# Patient Record
Sex: FEMALE | Race: WHITE | NOT HISPANIC OR LATINO | URBAN - METROPOLITAN AREA
[De-identification: names, ages, dates, MRNs, and addresses within clinical notes are randomized per-mention and may not be internally consistent; named-entity substitution may affect disease eponyms.]

---

## 2017-01-12 ENCOUNTER — EMERGENCY (EMERGENCY)
Facility: HOSPITAL | Age: 42
LOS: 1 days | Discharge: HOME | End: 2017-01-12
Attending: EMERGENCY MEDICINE | Admitting: EMERGENCY MEDICINE
Payer: COMMERCIAL

## 2017-01-12 VITALS
HEART RATE: 81 BPM | RESPIRATION RATE: 18 BRPM | DIASTOLIC BLOOD PRESSURE: 110 MMHG | SYSTOLIC BLOOD PRESSURE: 147 MMHG | TEMPERATURE: 98 F | OXYGEN SATURATION: 98 %

## 2017-01-12 DIAGNOSIS — G44.209 TENSION-TYPE HEADACHE, UNSPECIFIED, NOT INTRACTABLE: ICD-10-CM

## 2017-01-12 DIAGNOSIS — R42 DIZZINESS AND GIDDINESS: ICD-10-CM

## 2017-01-12 DIAGNOSIS — F32.9 MAJOR DEPRESSIVE DISORDER, SINGLE EPISODE, UNSPECIFIED: ICD-10-CM

## 2017-01-12 DIAGNOSIS — Z88.8 ALLERGY STATUS TO OTHER DRUGS, MEDICAMENTS AND BIOLOGICAL SUBSTANCES STATUS: ICD-10-CM

## 2017-01-12 PROCEDURE — 99285 EMERGENCY DEPT VISIT HI MDM: CPT | Mod: 25

## 2017-01-12 PROCEDURE — 62270 DX LMBR SPI PNXR: CPT

## 2017-01-12 RX ORDER — SODIUM CHLORIDE 9 MG/ML
1000 INJECTION INTRAMUSCULAR; INTRAVENOUS; SUBCUTANEOUS ONCE
Qty: 0 | Refills: 0 | Status: COMPLETED | OUTPATIENT
Start: 2017-01-12 | End: 2017-01-12

## 2017-01-12 RX ORDER — METOCLOPRAMIDE HCL 10 MG
10 TABLET ORAL ONCE
Qty: 0 | Refills: 0 | Status: COMPLETED | OUTPATIENT
Start: 2017-01-12 | End: 2017-01-12

## 2017-01-12 RX ORDER — ACETAMINOPHEN 500 MG
1000 TABLET ORAL ONCE
Qty: 0 | Refills: 0 | Status: COMPLETED | OUTPATIENT
Start: 2017-01-12 | End: 2017-01-12

## 2017-01-12 RX ORDER — DIPHENHYDRAMINE HCL 50 MG
25 CAPSULE ORAL ONCE
Qty: 0 | Refills: 0 | Status: COMPLETED | OUTPATIENT
Start: 2017-01-12 | End: 2017-01-12

## 2017-01-12 RX ADMIN — Medication 10 MILLIGRAM(S): at 23:40

## 2017-01-12 RX ADMIN — SODIUM CHLORIDE 1000 MILLILITER(S): 9 INJECTION INTRAMUSCULAR; INTRAVENOUS; SUBCUTANEOUS at 23:40

## 2017-01-12 RX ADMIN — Medication 25 MILLIGRAM(S): at 23:40

## 2017-01-12 RX ADMIN — Medication 400 MILLIGRAM(S): at 23:40
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
## 2017-01-13 VITALS
RESPIRATION RATE: 18 BRPM | HEART RATE: 84 BPM | TEMPERATURE: 97 F | SYSTOLIC BLOOD PRESSURE: 116 MMHG | OXYGEN SATURATION: 99 % | DIASTOLIC BLOOD PRESSURE: 51 MMHG

## 2017-01-13 LAB
ALBUMIN SERPL ELPH-MCNC: 4.2 G/DL — SIGNIFICANT CHANGE UP (ref 3.3–5)
ALP SERPL-CCNC: 33 U/L — LOW (ref 40–120)
ALT FLD-CCNC: 30 U/L RC — SIGNIFICANT CHANGE UP (ref 10–45)
ANION GAP SERPL CALC-SCNC: 13 MMOL/L — SIGNIFICANT CHANGE UP (ref 5–17)
APPEARANCE CSF: CLEAR — SIGNIFICANT CHANGE UP
APTT BLD: 30.7 SEC — SIGNIFICANT CHANGE UP (ref 27.5–37.4)
AST SERPL-CCNC: 24 U/L — SIGNIFICANT CHANGE UP (ref 10–40)
BASOPHILS # BLD AUTO: 0 K/UL — SIGNIFICANT CHANGE UP (ref 0–0.2)
BASOPHILS NFR BLD AUTO: 0.3 % — SIGNIFICANT CHANGE UP (ref 0–2)
BILIRUB SERPL-MCNC: 0.4 MG/DL — SIGNIFICANT CHANGE UP (ref 0.2–1.2)
BUN SERPL-MCNC: 20 MG/DL — SIGNIFICANT CHANGE UP (ref 7–23)
CALCIUM SERPL-MCNC: 9.5 MG/DL — SIGNIFICANT CHANGE UP (ref 8.4–10.5)
CHLORIDE SERPL-SCNC: 107 MMOL/L — SIGNIFICANT CHANGE UP (ref 96–108)
CO2 SERPL-SCNC: 22 MMOL/L — SIGNIFICANT CHANGE UP (ref 22–31)
COLOR CSF: SIGNIFICANT CHANGE UP
CREAT SERPL-MCNC: 0.99 MG/DL — SIGNIFICANT CHANGE UP (ref 0.5–1.3)
EOSINOPHIL # BLD AUTO: 0.2 K/UL — SIGNIFICANT CHANGE UP (ref 0–0.5)
EOSINOPHIL NFR BLD AUTO: 2.4 % — SIGNIFICANT CHANGE UP (ref 0–6)
GLUCOSE CSF-MCNC: 68 MG/DL — SIGNIFICANT CHANGE UP (ref 40–70)
GLUCOSE SERPL-MCNC: 101 MG/DL — HIGH (ref 70–99)
HCT VFR BLD CALC: 35.3 % — SIGNIFICANT CHANGE UP (ref 34.5–45)
HGB BLD-MCNC: 12.1 G/DL — SIGNIFICANT CHANGE UP (ref 11.5–15.5)
INR BLD: 1.05 RATIO — SIGNIFICANT CHANGE UP (ref 0.88–1.16)
LYMPHOCYTES # BLD AUTO: 2.6 K/UL — SIGNIFICANT CHANGE UP (ref 1–3.3)
LYMPHOCYTES # BLD AUTO: 36.9 % — SIGNIFICANT CHANGE UP (ref 13–44)
LYMPHOCYTES # CSF: 90 % — HIGH (ref 40–80)
MCHC RBC-ENTMCNC: 30.7 PG — SIGNIFICANT CHANGE UP (ref 27–34)
MCHC RBC-ENTMCNC: 34.3 GM/DL — SIGNIFICANT CHANGE UP (ref 32–36)
MCV RBC AUTO: 89.5 FL — SIGNIFICANT CHANGE UP (ref 80–100)
MONOCYTES # BLD AUTO: 0.6 K/UL — SIGNIFICANT CHANGE UP (ref 0–0.9)
MONOCYTES NFR BLD AUTO: 8 % — SIGNIFICANT CHANGE UP (ref 2–14)
MONOS+MACROS NFR CSF: 10 % — LOW (ref 15–45)
NEUTROPHILS # BLD AUTO: 3.6 K/UL — SIGNIFICANT CHANGE UP (ref 1.8–7.4)
NEUTROPHILS # CSF: SIGNIFICANT CHANGE UP (ref 0–6)
NEUTROPHILS NFR BLD AUTO: 52.4 % — SIGNIFICANT CHANGE UP (ref 43–77)
NRBC NFR CSF: 1 /UL — SIGNIFICANT CHANGE UP (ref 0–5)
PLATELET # BLD AUTO: 206 K/UL — SIGNIFICANT CHANGE UP (ref 150–400)
POTASSIUM SERPL-MCNC: 3.8 MMOL/L — SIGNIFICANT CHANGE UP (ref 3.5–5.3)
POTASSIUM SERPL-SCNC: 3.8 MMOL/L — SIGNIFICANT CHANGE UP (ref 3.5–5.3)
PROT CSF-MCNC: 39 MG/DL — SIGNIFICANT CHANGE UP (ref 15–45)
PROT SERPL-MCNC: 6.8 G/DL — SIGNIFICANT CHANGE UP (ref 6–8.3)
PROTHROM AB SERPL-ACNC: 11.4 SEC — SIGNIFICANT CHANGE UP (ref 10–13.1)
RBC # BLD: 3.95 M/UL — SIGNIFICANT CHANGE UP (ref 3.8–5.2)
RBC # CSF: 2 /UL — HIGH (ref 0–0)
RBC # FLD: 12.5 % — SIGNIFICANT CHANGE UP (ref 10.3–14.5)
SODIUM SERPL-SCNC: 142 MMOL/L — SIGNIFICANT CHANGE UP (ref 135–145)
TUBE TYPE: SIGNIFICANT CHANGE UP
WBC # BLD: 7 K/UL — SIGNIFICANT CHANGE UP (ref 3.8–10.5)
WBC # FLD AUTO: 7 K/UL — SIGNIFICANT CHANGE UP (ref 3.8–10.5)

## 2017-01-13 PROCEDURE — 99284 EMERGENCY DEPT VISIT MOD MDM: CPT | Mod: 25

## 2017-01-13 PROCEDURE — 62270 DX LMBR SPI PNXR: CPT

## 2017-01-13 PROCEDURE — 85730 THROMBOPLASTIN TIME PARTIAL: CPT

## 2017-01-13 PROCEDURE — 70450 CT HEAD/BRAIN W/O DYE: CPT

## 2017-01-13 PROCEDURE — 85610 PROTHROMBIN TIME: CPT

## 2017-01-13 PROCEDURE — 80053 COMPREHEN METABOLIC PANEL: CPT

## 2017-01-13 PROCEDURE — 96375 TX/PRO/DX INJ NEW DRUG ADDON: CPT | Mod: XU

## 2017-01-13 PROCEDURE — 82945 GLUCOSE OTHER FLUID: CPT

## 2017-01-13 PROCEDURE — 96374 THER/PROPH/DIAG INJ IV PUSH: CPT | Mod: XU

## 2017-01-13 PROCEDURE — 89051 BODY FLUID CELL COUNT: CPT

## 2017-01-13 PROCEDURE — 85027 COMPLETE CBC AUTOMATED: CPT

## 2017-01-13 PROCEDURE — 84702 CHORIONIC GONADOTROPIN TEST: CPT

## 2017-01-13 PROCEDURE — 87070 CULTURE OTHR SPECIMN AEROBIC: CPT

## 2017-01-13 PROCEDURE — 70450 CT HEAD/BRAIN W/O DYE: CPT | Mod: 26

## 2017-01-13 PROCEDURE — 84157 ASSAY OF PROTEIN OTHER: CPT

## 2017-01-13 PROCEDURE — 87205 SMEAR GRAM STAIN: CPT

## 2017-01-13 RX ORDER — MECLIZINE HCL 12.5 MG
12.5 TABLET ORAL ONCE
Qty: 0 | Refills: 0 | Status: COMPLETED | OUTPATIENT
Start: 2017-01-13 | End: 2017-01-13

## 2017-01-13 RX ADMIN — Medication 1000 MILLIGRAM(S): at 07:18

## 2017-01-13 RX ADMIN — Medication 12.5 MILLIGRAM(S): at 07:17

## 2017-01-13 NOTE — ED PROVIDER NOTE - CONSTITUTIONAL NEGATIVE STATEMENT, MLM
+HA, stiff neck. No fever, no chills, no chest pain, no SOB, no cough, no abdominal pain, no nausea, no vomiting, no joint pain, no rashes, no focal neurologic complaints, no psychiatric issues, otherwise as HPI.

## 2017-01-13 NOTE — ED PROVIDER NOTE - OBJECTIVE STATEMENT
Isabella Caro DO: Isabella Gordo, DO: 42F with hx of Depression p/w sudden onset b/l headache starting at 10:30 AM.  No prior hx of HA and describes this headache as worst headache of life. +Stiff neck, 10-15s of vision changes. Denies photophobia, phonophobia, n/v, recreational drug use, f/c, vision changes at present, abdominal pain.

## 2017-01-13 NOTE — ED PROCEDURE NOTE - CPROC ED POST PROC CARE GUIDE1
Instructed patient/caregiver regarding signs and symptoms of infection./Instructed patient/caregiver to follow-up with primary care physician./Verbal/written post procedure instructions were given to patient/caregiver.

## 2017-01-13 NOTE — ED ADULT NURSE REASSESSMENT NOTE - NS ED NURSE REASSESS COMMENT FT1
Pt c/o dizziness, pt A&Ox4, skin warm and dry, no c/o nausea.Pt medicated per MAR, will continue to monitor until pt feels well enough to go home.
Pt with steady gait and denied dizziness while in room getting dressed and walking.  While walking to bathroom on her way out she stated that she had another episode of dizziness, MD and RN spoke with pt, pt driving self and unwilling to call cab, agreeable to stay and trial meclizine before leaving.  Will reassess after medicating.  IV OUT.
Discharge teaching performed by night shift RN.  Pt stated she did not want to stay any longer.  Pt encouraged not to drive if dizzy.  MD aware.

## 2017-01-13 NOTE — ED PROVIDER NOTE - PHYSICAL EXAMINATION
Isabella Caro, DO: PE: CONSTITUTIONAL: Well appearing, well nourished, in no apparent distress. ENMT: Airway patent, nasal mucosa clear, mouth with normal mucosa, full ROM of neck HEAD: NCAT EYES: PERRL, EOMI, no nystagmus CARDIAC: RRR, no m/r/g, no pedal edema RESPIRATORY: CTA b/l, no adventitious sounds GI: Abdomen non-distended, non-tender MSK: Spine appears normal, range of motion is not limited, no muscle/joint tenderness NEURO: CNII-XII intact, 5/5 strength, full sensation all extremities, gait intact SKIN: No rash

## 2017-01-13 NOTE — ED PROVIDER NOTE - ATTENDING CONTRIBUTION TO CARE
Attending MD Prince:  I personally have seen and examined this patient.  Resident note reviewed and agree on plan of care and except where noted.  See MDM for details.

## 2017-01-13 NOTE — ED PROVIDER NOTE - PROGRESS NOTE DETAILS
Isabella Caro, DO: Pt feeling dizzy after ambulating while ready to discharge. Plan to give Meclizine & watch & wait. Isabella Caro, DO: Pt ultimately unwilling to wait for response of Meclizine. Discharge with Neuro f/u. Patient s/p LP with results reviewed. Patient initially stated she felt better and prepared discharge with neurology follow up. On discharge patient co recurrent HA and dizziness, discussed with patient to try meclizine and continue observing. Pt does not want to stay in the hospital any longer and wanted to be discharged, Plan discussed to followup and return for any concerns. CHIRAG

## 2017-01-13 NOTE — ED PROVIDER NOTE - MEDICAL DECISION MAKING DETAILS
Attending MD Prince: 41 yo female with PMH for depression and ADD presents with complaint of sudden onset of headache at 10:30a, brief vision changes, and some neck pain.  No nausea or vomiting.  No FH of aneurysms.  No hx of headache like this, "worst headache of her life".  Exam (MD Niki): A &O x 3, pupils equal and reactive, neck supple, lungs clear, abd soft, ext without edema, neuro non focal.

## 2017-01-18 ENCOUNTER — APPOINTMENT (OUTPATIENT)
Dept: INTERNAL MEDICINE | Facility: CLINIC | Age: 42
End: 2017-01-18

## 2017-01-18 VITALS
DIASTOLIC BLOOD PRESSURE: 60 MMHG | HEART RATE: 96 BPM | OXYGEN SATURATION: 99 % | SYSTOLIC BLOOD PRESSURE: 130 MMHG | HEIGHT: 72 IN | BODY MASS INDEX: 33.18 KG/M2 | WEIGHT: 245 LBS

## 2017-01-18 RX ORDER — CLONAZEPAM 2 MG/1
2 TABLET ORAL
Qty: 60 | Refills: 0 | Status: COMPLETED | COMMUNITY
Start: 2016-09-20

## 2017-01-25 ENCOUNTER — APPOINTMENT (OUTPATIENT)
Dept: NEUROLOGY | Facility: CLINIC | Age: 42
End: 2017-01-25

## 2017-02-16 ENCOUNTER — RESULT REVIEW (OUTPATIENT)
Age: 42
End: 2017-02-16

## 2017-03-02 RX ORDER — CYCLOBENZAPRINE HYDROCHLORIDE 10 MG/1
10 TABLET, FILM COATED ORAL
Qty: 30 | Refills: 0 | Status: COMPLETED | COMMUNITY
Start: 2017-03-02 | End: 2017-04-01

## 2017-03-09 ENCOUNTER — RX RENEWAL (OUTPATIENT)
Age: 42
End: 2017-03-09

## 2017-03-13 ENCOUNTER — APPOINTMENT (OUTPATIENT)
Dept: PHYSICAL MEDICINE AND REHAB | Facility: CLINIC | Age: 42
End: 2017-03-13

## 2017-03-27 ENCOUNTER — APPOINTMENT (OUTPATIENT)
Dept: INTERNAL MEDICINE | Facility: CLINIC | Age: 42
End: 2017-03-27

## 2017-03-27 VITALS
WEIGHT: 238 LBS | HEIGHT: 72 IN | DIASTOLIC BLOOD PRESSURE: 80 MMHG | HEART RATE: 85 BPM | OXYGEN SATURATION: 98 % | BODY MASS INDEX: 32.23 KG/M2 | SYSTOLIC BLOOD PRESSURE: 135 MMHG

## 2017-03-27 DIAGNOSIS — E04.1 NONTOXIC SINGLE THYROID NODULE: ICD-10-CM

## 2017-03-27 RX ORDER — OXYCODONE AND ACETAMINOPHEN 5; 325 MG/1; MG/1
5-325 TABLET ORAL 3 TIMES DAILY
Qty: 90 | Refills: 0 | Status: COMPLETED | COMMUNITY
Start: 2017-03-27 | End: 2017-04-26

## 2017-04-07 ENCOUNTER — EMERGENCY (EMERGENCY)
Facility: HOSPITAL | Age: 42
LOS: 1 days | Discharge: HOME | End: 2017-04-07
Attending: EMERGENCY MEDICINE | Admitting: EMERGENCY MEDICINE
Payer: COMMERCIAL

## 2017-04-07 ENCOUNTER — APPOINTMENT (OUTPATIENT)
Dept: INTERNAL MEDICINE | Facility: CLINIC | Age: 42
End: 2017-04-07

## 2017-04-07 VITALS
RESPIRATION RATE: 20 BRPM | HEART RATE: 98 BPM | DIASTOLIC BLOOD PRESSURE: 92 MMHG | SYSTOLIC BLOOD PRESSURE: 142 MMHG | HEIGHT: 72 IN | TEMPERATURE: 98 F | OXYGEN SATURATION: 98 % | WEIGHT: 235.01 LBS

## 2017-04-07 VITALS
OXYGEN SATURATION: 98 % | DIASTOLIC BLOOD PRESSURE: 90 MMHG | BODY MASS INDEX: 31.83 KG/M2 | HEIGHT: 72 IN | HEART RATE: 94 BPM | SYSTOLIC BLOOD PRESSURE: 140 MMHG | TEMPERATURE: 99 F | WEIGHT: 235 LBS

## 2017-04-07 DIAGNOSIS — N39.0 URINARY TRACT INFECTION, SITE NOT SPECIFIED: ICD-10-CM

## 2017-04-07 DIAGNOSIS — M54.9 DORSALGIA, UNSPECIFIED: ICD-10-CM

## 2017-04-07 DIAGNOSIS — G89.29 OTHER CHRONIC PAIN: ICD-10-CM

## 2017-04-07 DIAGNOSIS — Z88.8 ALLERGY STATUS TO OTHER DRUGS, MEDICAMENTS AND BIOLOGICAL SUBSTANCES STATUS: ICD-10-CM

## 2017-04-07 DIAGNOSIS — R51 HEADACHE: ICD-10-CM

## 2017-04-07 DIAGNOSIS — R00.2 PALPITATIONS: ICD-10-CM

## 2017-04-07 DIAGNOSIS — Z87.891 PERSONAL HISTORY OF NICOTINE DEPENDENCE: ICD-10-CM

## 2017-04-07 DIAGNOSIS — Z79.899 OTHER LONG TERM (CURRENT) DRUG THERAPY: ICD-10-CM

## 2017-04-07 DIAGNOSIS — N12 TUBULO-INTERSTITIAL NEPHRITIS, NOT SPECIFIED AS ACUTE OR CHRONIC: ICD-10-CM

## 2017-04-07 LAB
ALBUMIN SERPL ELPH-MCNC: 4 G/DL — SIGNIFICANT CHANGE UP (ref 3.3–5)
ALP SERPL-CCNC: 29 U/L — LOW (ref 40–120)
ALT FLD-CCNC: 23 U/L RC — SIGNIFICANT CHANGE UP (ref 10–45)
ANION GAP SERPL CALC-SCNC: 15 MMOL/L — SIGNIFICANT CHANGE UP (ref 5–17)
APPEARANCE UR: CLEAR — SIGNIFICANT CHANGE UP
AST SERPL-CCNC: 20 U/L — SIGNIFICANT CHANGE UP (ref 10–40)
BACTERIA # UR AUTO: ABNORMAL /HPF
BASOPHILS # BLD AUTO: 0 K/UL — SIGNIFICANT CHANGE UP (ref 0–0.2)
BASOPHILS NFR BLD AUTO: 0.4 % — SIGNIFICANT CHANGE UP (ref 0–2)
BILIRUB SERPL-MCNC: 0.4 MG/DL — SIGNIFICANT CHANGE UP (ref 0.2–1.2)
BILIRUB UR QL STRIP: NORMAL
BILIRUB UR-MCNC: NEGATIVE — SIGNIFICANT CHANGE UP
BUN SERPL-MCNC: 9 MG/DL — SIGNIFICANT CHANGE UP (ref 7–23)
CALCIUM SERPL-MCNC: 9 MG/DL — SIGNIFICANT CHANGE UP (ref 8.4–10.5)
CHLORIDE SERPL-SCNC: 104 MMOL/L — SIGNIFICANT CHANGE UP (ref 96–108)
CLARITY UR: CLEAR
CO2 SERPL-SCNC: 24 MMOL/L — SIGNIFICANT CHANGE UP (ref 22–31)
COLLECTION METHOD: NORMAL
COLOR SPEC: YELLOW — SIGNIFICANT CHANGE UP
CREAT SERPL-MCNC: 0.81 MG/DL — SIGNIFICANT CHANGE UP (ref 0.5–1.3)
DIFF PNL FLD: NEGATIVE — SIGNIFICANT CHANGE UP
EOSINOPHIL # BLD AUTO: 0.1 K/UL — SIGNIFICANT CHANGE UP (ref 0–0.5)
EOSINOPHIL NFR BLD AUTO: 1 % — SIGNIFICANT CHANGE UP (ref 0–6)
EPI CELLS # UR: SIGNIFICANT CHANGE UP /HPF
GLUCOSE SERPL-MCNC: 93 MG/DL — SIGNIFICANT CHANGE UP (ref 70–99)
GLUCOSE UR QL: NEGATIVE — SIGNIFICANT CHANGE UP
GLUCOSE UR-MCNC: NORMAL
HCG UR QL: 0.2 EU/DL
HCT VFR BLD CALC: 36.5 % — SIGNIFICANT CHANGE UP (ref 34.5–45)
HGB BLD-MCNC: 12.4 G/DL — SIGNIFICANT CHANGE UP (ref 11.5–15.5)
HGB UR QL STRIP.AUTO: NORMAL
KETONES UR-MCNC: ABNORMAL
KETONES UR-MCNC: NORMAL
LEUKOCYTE ESTERASE UR QL STRIP: NORMAL
LEUKOCYTE ESTERASE UR-ACNC: ABNORMAL
LYMPHOCYTES # BLD AUTO: 2.1 K/UL — SIGNIFICANT CHANGE UP (ref 1–3.3)
LYMPHOCYTES # BLD AUTO: 31.7 % — SIGNIFICANT CHANGE UP (ref 13–44)
MCHC RBC-ENTMCNC: 30.2 PG — SIGNIFICANT CHANGE UP (ref 27–34)
MCHC RBC-ENTMCNC: 34 GM/DL — SIGNIFICANT CHANGE UP (ref 32–36)
MCV RBC AUTO: 88.8 FL — SIGNIFICANT CHANGE UP (ref 80–100)
MONOCYTES # BLD AUTO: 0.5 K/UL — SIGNIFICANT CHANGE UP (ref 0–0.9)
MONOCYTES NFR BLD AUTO: 7.6 % — SIGNIFICANT CHANGE UP (ref 2–14)
NEUTROPHILS # BLD AUTO: 4 K/UL — SIGNIFICANT CHANGE UP (ref 1.8–7.4)
NEUTROPHILS NFR BLD AUTO: 59.3 % — SIGNIFICANT CHANGE UP (ref 43–77)
NITRITE UR QL STRIP: NORMAL
NITRITE UR-MCNC: NEGATIVE — SIGNIFICANT CHANGE UP
PH UR STRIP: 5.5
PH UR: 6 — SIGNIFICANT CHANGE UP (ref 4.8–8)
PLATELET # BLD AUTO: 214 K/UL — SIGNIFICANT CHANGE UP (ref 150–400)
POTASSIUM SERPL-MCNC: 4.2 MMOL/L — SIGNIFICANT CHANGE UP (ref 3.5–5.3)
POTASSIUM SERPL-SCNC: 4.2 MMOL/L — SIGNIFICANT CHANGE UP (ref 3.5–5.3)
PROT SERPL-MCNC: 6.6 G/DL — SIGNIFICANT CHANGE UP (ref 6–8.3)
PROT UR STRIP-MCNC: NORMAL
PROT UR-MCNC: SIGNIFICANT CHANGE UP
RBC # BLD: 4.11 M/UL — SIGNIFICANT CHANGE UP (ref 3.8–5.2)
RBC # FLD: 11.9 % — SIGNIFICANT CHANGE UP (ref 10.3–14.5)
RBC CASTS # UR COMP ASSIST: SIGNIFICANT CHANGE UP /HPF (ref 0–2)
SODIUM SERPL-SCNC: 143 MMOL/L — SIGNIFICANT CHANGE UP (ref 135–145)
SP GR SPEC: 1.02 — SIGNIFICANT CHANGE UP (ref 1.01–1.02)
SP GR UR STRIP: 1.03
T3 SERPL-MCNC: 159 NG/DL — SIGNIFICANT CHANGE UP (ref 80–200)
T4 AB SER-ACNC: 7.8 UG/DL — SIGNIFICANT CHANGE UP (ref 4.6–12)
TSH SERPL-MCNC: 0.28 UIU/ML — SIGNIFICANT CHANGE UP (ref 0.27–4.2)
UROBILINOGEN FLD QL: NEGATIVE — SIGNIFICANT CHANGE UP
WBC # BLD: 6.7 K/UL — SIGNIFICANT CHANGE UP (ref 3.8–10.5)
WBC # FLD AUTO: 6.7 K/UL — SIGNIFICANT CHANGE UP (ref 3.8–10.5)
WBC UR QL: SIGNIFICANT CHANGE UP /HPF (ref 0–5)

## 2017-04-07 PROCEDURE — 99284 EMERGENCY DEPT VISIT MOD MDM: CPT | Mod: 25

## 2017-04-07 PROCEDURE — 84480 ASSAY TRIIODOTHYRONINE (T3): CPT

## 2017-04-07 PROCEDURE — 80053 COMPREHEN METABOLIC PANEL: CPT

## 2017-04-07 PROCEDURE — 87086 URINE CULTURE/COLONY COUNT: CPT

## 2017-04-07 PROCEDURE — 99284 EMERGENCY DEPT VISIT MOD MDM: CPT

## 2017-04-07 PROCEDURE — 96374 THER/PROPH/DIAG INJ IV PUSH: CPT

## 2017-04-07 PROCEDURE — 84443 ASSAY THYROID STIM HORMONE: CPT

## 2017-04-07 PROCEDURE — 96375 TX/PRO/DX INJ NEW DRUG ADDON: CPT

## 2017-04-07 PROCEDURE — 84436 ASSAY OF TOTAL THYROXINE: CPT

## 2017-04-07 PROCEDURE — 85027 COMPLETE CBC AUTOMATED: CPT

## 2017-04-07 PROCEDURE — 81001 URINALYSIS AUTO W/SCOPE: CPT

## 2017-04-07 PROCEDURE — 93005 ELECTROCARDIOGRAM TRACING: CPT

## 2017-04-07 RX ORDER — SODIUM CHLORIDE 9 MG/ML
1000 INJECTION INTRAMUSCULAR; INTRAVENOUS; SUBCUTANEOUS ONCE
Qty: 0 | Refills: 0 | Status: COMPLETED | OUTPATIENT
Start: 2017-04-07 | End: 2017-04-07

## 2017-04-07 RX ORDER — ACETAMINOPHEN 500 MG
1000 TABLET ORAL ONCE
Qty: 0 | Refills: 0 | Status: COMPLETED | OUTPATIENT
Start: 2017-04-07 | End: 2017-04-07

## 2017-04-07 RX ORDER — CIPROFLOXACIN HYDROCHLORIDE 500 MG/1
500 TABLET, FILM COATED ORAL
Qty: 28 | Refills: 0 | Status: COMPLETED | COMMUNITY
Start: 2017-04-07 | End: 2017-04-21

## 2017-04-07 RX ORDER — CIPROFLOXACIN LACTATE 400MG/40ML
500 VIAL (ML) INTRAVENOUS ONCE
Qty: 0 | Refills: 0 | Status: COMPLETED | OUTPATIENT
Start: 2017-04-07 | End: 2017-04-07

## 2017-04-07 RX ORDER — METOCLOPRAMIDE HCL 10 MG
10 TABLET ORAL ONCE
Qty: 0 | Refills: 0 | Status: COMPLETED | OUTPATIENT
Start: 2017-04-07 | End: 2017-04-07

## 2017-04-07 RX ADMIN — Medication 10 MILLIGRAM(S): at 19:03

## 2017-04-07 RX ADMIN — Medication 500 MILLIGRAM(S): at 20:37

## 2017-04-07 RX ADMIN — SODIUM CHLORIDE 1000 MILLILITER(S): 9 INJECTION INTRAMUSCULAR; INTRAVENOUS; SUBCUTANEOUS at 18:30

## 2017-04-07 RX ADMIN — Medication 400 MILLIGRAM(S): at 18:30

## 2017-04-07 RX ADMIN — Medication 1000 MILLIGRAM(S): at 20:37

## 2017-04-07 NOTE — ED ADULT NURSE NOTE - CHIEF COMPLAINT
The patient is a 42y Female complaining of The patient is a 42y Female complaining of multiple complaints

## 2017-04-07 NOTE — ED PROVIDER NOTE - MEDICAL DECISION MAKING DETAILS
Dr. Queen (Attending Physician)  Pt. with ho depression improved after starting adderall two years ago now pw 2 weeks of headache, palpitations, near syncope, subj. fevers, chills, diaphoresis, Dr. Queen (Attending Physician)  Pt. with ho depression improved after starting adderall two years ago now pw 2 weeks of headache, palpitations, near syncope, subj. fevers, chills, diaphoresis, urinary freq, foul smelling urine. Had thyroid nodule on MRI two years ago. Seen at [facility] today dx with UTI. Normal neuro exam including sens, strength, gait, heart rrr, no m/r/g, abd. soft nontender.  Will check ECG basic labs, tsh, t4, t3, ua and reassess.

## 2017-04-07 NOTE — ED PROVIDER NOTE - OBJECTIVE STATEMENT
41y/o F c/o severe H/A, dizziness x 2 days. + lightheaded/fatigue. states symptoms occurred while walking. excessive sweating x 4 months. +palpitations intermittently. + SOB with exertion (walking). + chills. + nausea. worsening chronic back pain. + weight loss. foul smelling urine, frequent urination, excessive thirst, decrease appetite. had MRI 2 months ago and found to have cyst on thyroid; pt has not seen an endocrinologist yet.  denies fever, V/D, abd pain.  PMD Dr. Aden Turner

## 2017-04-07 NOTE — ED PROVIDER NOTE - PLAN OF CARE
1. Take Cipro 500 mg 1 tab 2x/day for 5 days.  Increase fluids. Take Motrin 600mg every 8 hours with food if needed for pain.   2.  Follow up with your PMD within 48-72 hours.  3. Worsening pain, new fever, chills, nausea, vomiting return to ER

## 2017-04-07 NOTE — ED ADULT NURSE NOTE - OBJECTIVE STATEMENT
42 year  old female with co back pain/dx with UTI/dizziness/lightheadedness/palps. Pt states most symptoms present over 1 month including intermittent dizziness/chills back pain intermittent. 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 went to PMD and was sent here for further eval. on arrival pt is afebrile denies chills no HA blurry vision reports having back pain worse on R lower side non radiating no numbness or tingling denies CP or palpations or SOB at present no abd pain non tender nausea mild no vomiting no CVA tenderness denies dysuria/hematuria seen by PA pending MD exam  bed rails up

## 2017-04-07 NOTE — ED PROVIDER NOTE - ATTENDING CONTRIBUTION TO CARE
Dr. Queen (Attending Physician)  I performed a history and physical exam of the patient and discussed their management with the resident. I reviewed the resident's note and agree with the documented findings and plan of care. My medical decision making and observations are found above.

## 2017-04-07 NOTE — ED PROVIDER NOTE - CARE PLAN
Principal Discharge DX:	UTI (urinary tract infection)  Instructions for follow-up, activity and diet:	1. Take Cipro 500 mg 1 tab 2x/day for 5 days.  Increase fluids. Take Motrin 600mg every 8 hours with food if needed for pain.   2.  Follow up with your PMD within 48-72 hours.  3. Worsening pain, new fever, chills, nausea, vomiting return to ER

## 2017-04-08 LAB
CULTURE RESULTS: NO GROWTH — SIGNIFICANT CHANGE UP
SPECIMEN SOURCE: SIGNIFICANT CHANGE UP

## 2017-04-11 LAB — BACTERIA UR CULT: NORMAL

## 2017-04-12 ENCOUNTER — APPOINTMENT (OUTPATIENT)
Dept: PHYSICAL MEDICINE AND REHAB | Facility: CLINIC | Age: 42
End: 2017-04-12

## 2017-04-12 VITALS
OXYGEN SATURATION: 98 % | HEIGHT: 72 IN | BODY MASS INDEX: 31.83 KG/M2 | SYSTOLIC BLOOD PRESSURE: 136 MMHG | TEMPERATURE: 98.1 F | DIASTOLIC BLOOD PRESSURE: 90 MMHG | WEIGHT: 235 LBS | HEART RATE: 95 BPM

## 2017-04-21 ENCOUNTER — APPOINTMENT (OUTPATIENT)
Dept: PHYSICAL MEDICINE AND REHAB | Facility: CLINIC | Age: 42
End: 2017-04-21

## 2017-04-26 ENCOUNTER — APPOINTMENT (OUTPATIENT)
Dept: INTERNAL MEDICINE | Facility: CLINIC | Age: 42
End: 2017-04-26

## 2017-05-15 ENCOUNTER — FORM ENCOUNTER (OUTPATIENT)
Age: 42
End: 2017-05-15

## 2017-05-15 ENCOUNTER — APPOINTMENT (OUTPATIENT)
Dept: INTERNAL MEDICINE | Facility: CLINIC | Age: 42
End: 2017-05-15

## 2017-05-16 ENCOUNTER — OUTPATIENT (OUTPATIENT)
Dept: OUTPATIENT SERVICES | Facility: HOSPITAL | Age: 42
LOS: 1 days | End: 2017-05-16
Payer: COMMERCIAL

## 2017-05-16 ENCOUNTER — APPOINTMENT (OUTPATIENT)
Dept: INTERNAL MEDICINE | Facility: CLINIC | Age: 42
End: 2017-05-16

## 2017-05-16 ENCOUNTER — APPOINTMENT (OUTPATIENT)
Dept: MRI IMAGING | Facility: IMAGING CENTER | Age: 42
End: 2017-05-16

## 2017-05-16 VITALS
TEMPERATURE: 98.6 F | BODY MASS INDEX: 31.83 KG/M2 | HEART RATE: 91 BPM | WEIGHT: 235 LBS | OXYGEN SATURATION: 99 % | SYSTOLIC BLOOD PRESSURE: 134 MMHG | HEIGHT: 72 IN | DIASTOLIC BLOOD PRESSURE: 80 MMHG

## 2017-05-16 DIAGNOSIS — R69 ILLNESS, UNSPECIFIED: ICD-10-CM

## 2017-05-16 DIAGNOSIS — Z87.898 PERSONAL HISTORY OF OTHER SPECIFIED CONDITIONS: ICD-10-CM

## 2017-05-16 DIAGNOSIS — Z00.8 ENCOUNTER FOR OTHER GENERAL EXAMINATION: ICD-10-CM

## 2017-05-16 PROCEDURE — 72148 MRI LUMBAR SPINE W/O DYE: CPT

## 2017-05-16 RX ORDER — SACCHAROMYCES BOULARDII 50 MG
250 CAPSULE ORAL TWICE DAILY
Qty: 60 | Refills: 3 | Status: COMPLETED | COMMUNITY
Start: 2017-05-16 | End: 2017-09-13

## 2017-05-16 RX ORDER — METHYLPREDNISOLONE 4 MG/1
4 TABLET ORAL
Qty: 1 | Refills: 4 | Status: COMPLETED | COMMUNITY
Start: 2017-03-09 | End: 2017-03-12

## 2017-05-16 RX ORDER — FLUOXETINE HYDROCHLORIDE 10 MG/1
10 CAPSULE ORAL
Qty: 30 | Refills: 0 | Status: COMPLETED | COMMUNITY
Start: 2017-03-28 | End: 2017-04-16

## 2017-05-16 RX ORDER — FLUCONAZOLE 150 MG/1
150 TABLET ORAL
Qty: 1 | Refills: 0 | Status: COMPLETED | COMMUNITY
Start: 2017-01-25

## 2017-05-16 RX ORDER — TERCONAZOLE 8 MG/G
0.8 CREAM VAGINAL
Qty: 20 | Refills: 0 | Status: COMPLETED | COMMUNITY
Start: 2017-01-25

## 2017-06-23 ENCOUNTER — APPOINTMENT (OUTPATIENT)
Dept: PHYSICAL MEDICINE AND REHAB | Facility: CLINIC | Age: 42
End: 2017-06-23

## 2017-06-28 ENCOUNTER — APPOINTMENT (OUTPATIENT)
Dept: INTERNAL MEDICINE | Facility: CLINIC | Age: 42
End: 2017-06-28

## 2017-07-18 ENCOUNTER — APPOINTMENT (OUTPATIENT)
Dept: INTERNAL MEDICINE | Facility: CLINIC | Age: 42
End: 2017-07-18

## 2017-07-18 VITALS
OXYGEN SATURATION: 98 % | HEART RATE: 76 BPM | DIASTOLIC BLOOD PRESSURE: 70 MMHG | SYSTOLIC BLOOD PRESSURE: 110 MMHG | BODY MASS INDEX: 31.42 KG/M2 | HEIGHT: 72 IN | WEIGHT: 232 LBS

## 2017-07-20 ENCOUNTER — EMERGENCY (EMERGENCY)
Facility: HOSPITAL | Age: 42
LOS: 1 days | Discharge: HOME | End: 2017-07-20
Attending: EMERGENCY MEDICINE | Admitting: EMERGENCY MEDICINE
Payer: COMMERCIAL

## 2017-07-20 VITALS
HEART RATE: 90 BPM | OXYGEN SATURATION: 98 % | TEMPERATURE: 99 F | SYSTOLIC BLOOD PRESSURE: 193 MMHG | RESPIRATION RATE: 20 BRPM | DIASTOLIC BLOOD PRESSURE: 111 MMHG

## 2017-07-20 LAB
ALBUMIN SERPL ELPH-MCNC: 4.2 G/DL — SIGNIFICANT CHANGE UP (ref 3.3–5)
ALP SERPL-CCNC: 35 U/L — LOW (ref 40–120)
ALT FLD-CCNC: 26 U/L RC — SIGNIFICANT CHANGE UP (ref 10–45)
ANION GAP SERPL CALC-SCNC: 13 MMOL/L — SIGNIFICANT CHANGE UP (ref 5–17)
AST SERPL-CCNC: 24 U/L — SIGNIFICANT CHANGE UP (ref 10–40)
BASOPHILS # BLD AUTO: 0 K/UL — SIGNIFICANT CHANGE UP (ref 0–0.2)
BASOPHILS NFR BLD AUTO: 0.3 % — SIGNIFICANT CHANGE UP (ref 0–2)
BILIRUB SERPL-MCNC: 0.2 MG/DL — SIGNIFICANT CHANGE UP (ref 0.2–1.2)
BUN SERPL-MCNC: 13 MG/DL — SIGNIFICANT CHANGE UP (ref 7–23)
CALCIUM SERPL-MCNC: 9.1 MG/DL — SIGNIFICANT CHANGE UP (ref 8.4–10.5)
CHLORIDE SERPL-SCNC: 108 MMOL/L — SIGNIFICANT CHANGE UP (ref 96–108)
CO2 SERPL-SCNC: 25 MMOL/L — SIGNIFICANT CHANGE UP (ref 22–31)
CREAT SERPL-MCNC: 0.7 MG/DL — SIGNIFICANT CHANGE UP (ref 0.5–1.3)
EOSINOPHIL # BLD AUTO: 0.1 K/UL — SIGNIFICANT CHANGE UP (ref 0–0.5)
EOSINOPHIL NFR BLD AUTO: 1.6 % — SIGNIFICANT CHANGE UP (ref 0–6)
GLUCOSE SERPL-MCNC: 139 MG/DL — HIGH (ref 70–99)
HCG SERPL QL: NEGATIVE — SIGNIFICANT CHANGE UP
HCT VFR BLD CALC: 39.5 % — SIGNIFICANT CHANGE UP (ref 34.5–45)
HGB BLD-MCNC: 13.3 G/DL — SIGNIFICANT CHANGE UP (ref 11.5–15.5)
LYMPHOCYTES # BLD AUTO: 1.8 K/UL — SIGNIFICANT CHANGE UP (ref 1–3.3)
LYMPHOCYTES # BLD AUTO: 25.8 % — SIGNIFICANT CHANGE UP (ref 13–44)
MAGNESIUM SERPL-MCNC: 2.1 MG/DL — SIGNIFICANT CHANGE UP (ref 1.6–2.6)
MCHC RBC-ENTMCNC: 30.3 PG — SIGNIFICANT CHANGE UP (ref 27–34)
MCHC RBC-ENTMCNC: 33.6 GM/DL — SIGNIFICANT CHANGE UP (ref 32–36)
MCV RBC AUTO: 90.2 FL — SIGNIFICANT CHANGE UP (ref 80–100)
MONOCYTES # BLD AUTO: 0.4 K/UL — SIGNIFICANT CHANGE UP (ref 0–0.9)
MONOCYTES NFR BLD AUTO: 5.7 % — SIGNIFICANT CHANGE UP (ref 2–14)
NEUTROPHILS # BLD AUTO: 4.6 K/UL — SIGNIFICANT CHANGE UP (ref 1.8–7.4)
NEUTROPHILS NFR BLD AUTO: 66.5 % — SIGNIFICANT CHANGE UP (ref 43–77)
PHOSPHATE SERPL-MCNC: 3.3 MG/DL — SIGNIFICANT CHANGE UP (ref 2.5–4.5)
PLATELET # BLD AUTO: 244 K/UL — SIGNIFICANT CHANGE UP (ref 150–400)
POTASSIUM SERPL-MCNC: 4 MMOL/L — SIGNIFICANT CHANGE UP (ref 3.5–5.3)
POTASSIUM SERPL-SCNC: 4 MMOL/L — SIGNIFICANT CHANGE UP (ref 3.5–5.3)
PROT SERPL-MCNC: 7.1 G/DL — SIGNIFICANT CHANGE UP (ref 6–8.3)
RBC # BLD: 4.38 M/UL — SIGNIFICANT CHANGE UP (ref 3.8–5.2)
RBC # FLD: 12.1 % — SIGNIFICANT CHANGE UP (ref 10.3–14.5)
SODIUM SERPL-SCNC: 146 MMOL/L — HIGH (ref 135–145)
WBC # BLD: 6.8 K/UL — SIGNIFICANT CHANGE UP (ref 3.8–10.5)
WBC # FLD AUTO: 6.8 K/UL — SIGNIFICANT CHANGE UP (ref 3.8–10.5)

## 2017-07-20 PROCEDURE — 99285 EMERGENCY DEPT VISIT HI MDM: CPT

## 2017-07-20 PROCEDURE — 85027 COMPLETE CBC AUTOMATED: CPT

## 2017-07-20 PROCEDURE — 80053 COMPREHEN METABOLIC PANEL: CPT

## 2017-07-20 PROCEDURE — 70450 CT HEAD/BRAIN W/O DYE: CPT | Mod: 26

## 2017-07-20 PROCEDURE — 70450 CT HEAD/BRAIN W/O DYE: CPT

## 2017-07-20 PROCEDURE — 84100 ASSAY OF PHOSPHORUS: CPT

## 2017-07-20 PROCEDURE — 83735 ASSAY OF MAGNESIUM: CPT

## 2017-07-20 PROCEDURE — 84703 CHORIONIC GONADOTROPIN ASSAY: CPT

## 2017-07-20 PROCEDURE — 99284 EMERGENCY DEPT VISIT MOD MDM: CPT | Mod: 25

## 2017-07-20 RX ORDER — NORGESTIMATE AND ETHINYL ESTRADIOL 7DAYSX3 LO
0 KIT ORAL
Qty: 0 | Refills: 0 | COMMUNITY

## 2017-07-20 NOTE — ED PROVIDER NOTE - NEUROLOGICAL, MLM
Alert and oriented, no focal deficits, no motor or sensory deficits. CN2-12 intact. No dysmetria of upper extremity.
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
## 2017-07-20 NOTE — ED PROVIDER NOTE - MEDICAL DECISION MAKING DETAILS
Sondra resident: 43 y/o with PMH of anxiety no longer on medications p/w episode of confusion and shaking - both events were separate - states driving to mothers house yesterday had a brief episode of confusion that resolved - today she had a brief episode of full body shaking with full awareness and no post-ictal - states never seen neurology for this despite having 6 episodes in 6 months - currently feels well but is nervous - low suspicion for true seizure - patient tapered her psych medications under guidance of her MD, so unlikely withdrawal symptoms - will check lytes, CT head, and d/c with neuro Sondra resident: 41 y/o with PMH of anxiety no longer on medications p/w episode of confusion and shaking - both events were separate - states driving to mothers house yesterday had a brief episode of confusion that resolved - today she had a brief episode of full body shaking with full awareness and no post-ictal - states never seen neurology for this despite having 6 episodes in 6 months - currently feels well but is nervous - low suspicion for true seizure - patient tapered her psych medications under guidance of her MD, so unlikely withdrawal symptoms - will check lytes, CT head, and d/c with neuro  [place]: See attending statement below

## 2017-07-20 NOTE — ED PROVIDER NOTE - CARE PLAN
Principal Discharge DX:	Tremor  Instructions for follow-up, activity and diet:	1) Please return to the ED should you have any new or worsening symptoms, worsening pain, develop confusion, seizure, or any concerning symptoms   2) Please follow up with neurology in 2-3 days. Please call [phone] to make an appointment.  3) Please do not drive until cleared by Neurology   4) Please stop taking pseudoephedrine as this can exacerbate your hypertension

## 2017-07-20 NOTE — ED PROVIDER NOTE - PROGRESS NOTE DETAILS
workup WNL - no organic causes found for shaking episode - patient feels well and will f/u with neurology - d/w patient that she should not drive until cleared by neurology - patient HTN here but has been taking sudafed (Pseudoephedrine) - d/w patient that this can cause hypertensive and she should not take this for her sinus congestion

## 2017-07-20 NOTE — ED PROVIDER NOTE - EYES, MLM
Clear bilaterally, pupils equal, round and reactive to light. EOMI. Horizontal nystagmus that resolves after 3-4 beats. No vertical nystagmus b/l.

## 2017-07-20 NOTE — ED PROVIDER NOTE - PLAN OF CARE
1) Please return to the ED should you have any new or worsening symptoms, worsening pain, develop confusion, seizure, or any concerning symptoms   2) Please follow up with neurology in 2-3 days. Please call [phone] to make an appointment.  3) Please do not drive until cleared by Neurology   4) Please stop taking pseudoephedrine as this can exacerbate your hypertension

## 2017-07-20 NOTE — ED PROVIDER NOTE - CONSTITUTIONAL, MLM
normal... Well appearing, well nourished, awake, alert, oriented to person, place, time/situation and in no apparent distress. Anxious and tearful during encounter.

## 2017-07-20 NOTE — ED PROVIDER NOTE - ATTENDING CONTRIBUTION TO CARE
42y F anxiety, panic disorder, fibromyalgia, HTN not on meds, cervicalgia, recently dx sinusitis here with episode of shaking, confusion. Pt states that she was seen by PCP Dr Turner 2 days ago and diagnosed w sinusitis started on Pseudoephedrine, singulair. States last night while driving to her mothers house felt disoriented and got lost, had to pull over. Also states Episode lasted several minutes before resolving and she was able to go to her mothers and then to a friends house. States that this AM she felt very cold and had shaking chills. Stated that during episode she was fully awake and cognizant however felt as though she couldn't speak. states that this has happened on several previous occasions in past 6 months. No LOC. No incontinence. No tongue bite. Denies any head trauma. No f/c, cp, palp, sob, abd pain, vision change, HA, neckpain currently.   Gen: WNWD NAD  HEENT: NCAT PERRL EOMI normal pharynx  Neck: supple  CV: RRR, no murmur  Lung: CTA BL  Abd: +BS soft NTND  Ext: wwp, palp pulses, FROMx4, no cce  Neuro: A&Ox3, CN grossly intact, sensation intact, motor 5/5 throughout  AP: 42y F hx as above here with episode of disorientation last night, chills today and unable to speak during episode. NO LOC. Neuro intact. Story not consistent with TGA, TIA, seizure. Labs, CT brain. OP Neuro FU.

## 2017-07-20 NOTE — ED ADULT NURSE NOTE - OBJECTIVE STATEMENT
43 yo F came to ED c/o seizures.  A&Ox4.  Pt states that for the past few months she has been getting shivering and unable to move for short periods of time, mostly witnessed by friends and family.  PMH includes anxiety which she used to take medication for, but has now weened off and doctor is aware.

## 2017-07-21 ENCOUNTER — RX RENEWAL (OUTPATIENT)
Age: 42
End: 2017-07-21

## 2017-07-21 RX ORDER — AMOXICILLIN AND CLAVULANATE POTASSIUM 875; 125 MG/1; MG/1
875-125 TABLET, COATED ORAL
Qty: 14 | Refills: 0 | Status: COMPLETED | COMMUNITY
Start: 2017-07-21 | End: 2017-07-28

## 2017-07-21 RX ORDER — PSEUDOEPHEDRINE HYDROCHLORIDE 60 MG/1
60 TABLET ORAL
Qty: 60 | Refills: 0 | Status: COMPLETED | COMMUNITY
Start: 2017-07-18 | End: 2017-07-20

## 2017-10-30 ENCOUNTER — INPATIENT (INPATIENT)
Facility: HOSPITAL | Age: 42
LOS: 3 days | Discharge: HOME | End: 2017-11-03
Attending: PSYCHIATRY & NEUROLOGY | Admitting: PSYCHIATRY & NEUROLOGY
Payer: COMMERCIAL

## 2017-10-30 ENCOUNTER — EMERGENCY (EMERGENCY)
Facility: HOSPITAL | Age: 42
LOS: 1 days | End: 2017-10-30
Attending: EMERGENCY MEDICINE | Admitting: EMERGENCY MEDICINE
Payer: COMMERCIAL

## 2017-10-30 VITALS
HEART RATE: 88 BPM | SYSTOLIC BLOOD PRESSURE: 129 MMHG | OXYGEN SATURATION: 97 % | TEMPERATURE: 97 F | RESPIRATION RATE: 18 BRPM | DIASTOLIC BLOOD PRESSURE: 86 MMHG

## 2017-10-30 VITALS
SYSTOLIC BLOOD PRESSURE: 130 MMHG | DIASTOLIC BLOOD PRESSURE: 92 MMHG | RESPIRATION RATE: 16 BRPM | HEART RATE: 93 BPM | OXYGEN SATURATION: 97 % | TEMPERATURE: 97 F

## 2017-10-30 DIAGNOSIS — F39 UNSPECIFIED MOOD [AFFECTIVE] DISORDER: ICD-10-CM

## 2017-10-30 DIAGNOSIS — F32.9 MAJOR DEPRESSIVE DISORDER, SINGLE EPISODE, UNSPECIFIED: ICD-10-CM

## 2017-10-30 LAB
AMPHET UR-MCNC: NEGATIVE — SIGNIFICANT CHANGE UP
ANION GAP SERPL CALC-SCNC: 14 MMOL/L — SIGNIFICANT CHANGE UP (ref 5–17)
APAP SERPL-MCNC: <15 UG/ML — SIGNIFICANT CHANGE UP (ref 10–30)
APPEARANCE UR: CLEAR — SIGNIFICANT CHANGE UP
BARBITURATES UR SCN-MCNC: NEGATIVE — SIGNIFICANT CHANGE UP
BASOPHILS # BLD AUTO: 0.1 K/UL — SIGNIFICANT CHANGE UP (ref 0–0.2)
BASOPHILS NFR BLD AUTO: 0.8 % — SIGNIFICANT CHANGE UP (ref 0–2)
BENZODIAZ UR-MCNC: POSITIVE
BILIRUB UR-MCNC: NEGATIVE — SIGNIFICANT CHANGE UP
BUN SERPL-MCNC: 11 MG/DL — SIGNIFICANT CHANGE UP (ref 7–23)
CALCIUM SERPL-MCNC: 9.8 MG/DL — SIGNIFICANT CHANGE UP (ref 8.4–10.5)
CHLORIDE SERPL-SCNC: 102 MMOL/L — SIGNIFICANT CHANGE UP (ref 96–108)
CO2 SERPL-SCNC: 24 MMOL/L — SIGNIFICANT CHANGE UP (ref 22–31)
COCAINE METAB.OTHER UR-MCNC: NEGATIVE — SIGNIFICANT CHANGE UP
COLOR SPEC: YELLOW — SIGNIFICANT CHANGE UP
CREAT SERPL-MCNC: 0.8 MG/DL — SIGNIFICANT CHANGE UP (ref 0.5–1.3)
DIFF PNL FLD: NEGATIVE — SIGNIFICANT CHANGE UP
EOSINOPHIL # BLD AUTO: 0.2 K/UL — SIGNIFICANT CHANGE UP (ref 0–0.5)
EOSINOPHIL NFR BLD AUTO: 2 % — SIGNIFICANT CHANGE UP (ref 0–6)
GLUCOSE SERPL-MCNC: 82 MG/DL — SIGNIFICANT CHANGE UP (ref 70–99)
GLUCOSE UR QL: NEGATIVE — SIGNIFICANT CHANGE UP
HCG UR QL: NEGATIVE — SIGNIFICANT CHANGE UP
HCT VFR BLD CALC: 46.8 % — HIGH (ref 34.5–45)
HGB BLD-MCNC: 15.2 G/DL — SIGNIFICANT CHANGE UP (ref 11.5–15.5)
KETONES UR-MCNC: NEGATIVE — SIGNIFICANT CHANGE UP
LEUKOCYTE ESTERASE UR-ACNC: NEGATIVE — SIGNIFICANT CHANGE UP
LYMPHOCYTES # BLD AUTO: 2.8 K/UL — SIGNIFICANT CHANGE UP (ref 1–3.3)
LYMPHOCYTES # BLD AUTO: 33.9 % — SIGNIFICANT CHANGE UP (ref 13–44)
MCHC RBC-ENTMCNC: 29.3 PG — SIGNIFICANT CHANGE UP (ref 27–34)
MCHC RBC-ENTMCNC: 32.4 GM/DL — SIGNIFICANT CHANGE UP (ref 32–36)
MCV RBC AUTO: 90.2 FL — SIGNIFICANT CHANGE UP (ref 80–100)
METHADONE UR-MCNC: NEGATIVE — SIGNIFICANT CHANGE UP
MONOCYTES # BLD AUTO: 0.5 K/UL — SIGNIFICANT CHANGE UP (ref 0–0.9)
MONOCYTES NFR BLD AUTO: 5.6 % — SIGNIFICANT CHANGE UP (ref 2–14)
NEUTROPHILS # BLD AUTO: 4.7 K/UL — SIGNIFICANT CHANGE UP (ref 1.8–7.4)
NEUTROPHILS NFR BLD AUTO: 57.6 % — SIGNIFICANT CHANGE UP (ref 43–77)
NITRITE UR-MCNC: NEGATIVE — SIGNIFICANT CHANGE UP
OPIATES UR-MCNC: NEGATIVE — SIGNIFICANT CHANGE UP
OXYCODONE UR-MCNC: NEGATIVE — SIGNIFICANT CHANGE UP
PCP SPEC-MCNC: SIGNIFICANT CHANGE UP
PCP UR-MCNC: NEGATIVE — SIGNIFICANT CHANGE UP
PH UR: 6 — SIGNIFICANT CHANGE UP (ref 5–8)
PLATELET # BLD AUTO: 260 K/UL — SIGNIFICANT CHANGE UP (ref 150–400)
POTASSIUM SERPL-MCNC: 4.6 MMOL/L — SIGNIFICANT CHANGE UP (ref 3.5–5.3)
POTASSIUM SERPL-SCNC: 4.6 MMOL/L — SIGNIFICANT CHANGE UP (ref 3.5–5.3)
PROT UR-MCNC: NEGATIVE — SIGNIFICANT CHANGE UP
RBC # BLD: 5.18 M/UL — SIGNIFICANT CHANGE UP (ref 3.8–5.2)
RBC # FLD: 12.1 % — SIGNIFICANT CHANGE UP (ref 10.3–14.5)
SALICYLATES SERPL-MCNC: <2 MG/DL — LOW (ref 15–30)
SODIUM SERPL-SCNC: 140 MMOL/L — SIGNIFICANT CHANGE UP (ref 135–145)
SP GR SPEC: 1.01 — SIGNIFICANT CHANGE UP (ref 1.01–1.02)
THC UR QL: POSITIVE
UROBILINOGEN FLD QL: NEGATIVE — SIGNIFICANT CHANGE UP
WBC # BLD: 8.2 K/UL — SIGNIFICANT CHANGE UP (ref 3.8–10.5)
WBC # FLD AUTO: 8.2 K/UL — SIGNIFICANT CHANGE UP (ref 3.8–10.5)

## 2017-10-30 PROCEDURE — 93005 ELECTROCARDIOGRAM TRACING: CPT

## 2017-10-30 PROCEDURE — 85027 COMPLETE CBC AUTOMATED: CPT

## 2017-10-30 PROCEDURE — 80307 DRUG TEST PRSMV CHEM ANLYZR: CPT

## 2017-10-30 PROCEDURE — 80048 BASIC METABOLIC PNL TOTAL CA: CPT

## 2017-10-30 PROCEDURE — 99285 EMERGENCY DEPT VISIT HI MDM: CPT

## 2017-10-30 PROCEDURE — 93010 ELECTROCARDIOGRAM REPORT: CPT | Mod: NC

## 2017-10-30 PROCEDURE — 81025 URINE PREGNANCY TEST: CPT

## 2017-10-30 PROCEDURE — 99231 SBSQ HOSP IP/OBS SF/LOW 25: CPT

## 2017-10-30 PROCEDURE — 99285 EMERGENCY DEPT VISIT HI MDM: CPT | Mod: 25

## 2017-10-30 PROCEDURE — 81003 URINALYSIS AUTO W/O SCOPE: CPT

## 2017-10-30 RX ORDER — ALPRAZOLAM 0.25 MG
0.5 TABLET ORAL ONCE
Qty: 0 | Refills: 0 | Status: DISCONTINUED | OUTPATIENT
Start: 2017-10-30 | End: 2017-10-30

## 2017-10-30 RX ORDER — ACETAMINOPHEN 500 MG
650 TABLET ORAL ONCE
Qty: 0 | Refills: 0 | Status: COMPLETED | OUTPATIENT
Start: 2017-10-30 | End: 2017-10-30

## 2017-10-30 RX ORDER — ACETAMINOPHEN 500 MG
650 TABLET ORAL ONCE
Qty: 0 | Refills: 0 | Status: DISCONTINUED | OUTPATIENT
Start: 2017-10-30 | End: 2017-11-11

## 2017-10-30 RX ADMIN — Medication 650 MILLIGRAM(S): at 17:59

## 2017-10-30 RX ADMIN — Medication 0.5 MILLIGRAM(S): at 20:55

## 2017-10-30 NOTE — ED ADULT TRIAGE NOTE - CHIEF COMPLAINT QUOTE
"I want to kill myself." " I ran out of my meds"  pt denies chest pain, sob, n,v, dizziness, weakness, fever, chills, headache
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
## 2017-10-30 NOTE — ED PROVIDER NOTE - OBJECTIVE STATEMENT
Patient feels trapped and states she can't go on. States that ending her life is the only option left. Feels isolated and depressed. Patient states that she has not made an attempt to kill herself but took a bunch of clonidine last night "to sleep". Has many stressors. Has tried to kill herself in the past. had a psychiatrist but he . Sees a therapist at [facility]. was on clonidine, trprol and abilify but now only takes clonidine sometimes. not eating or sleeping. No HI. no voices no hallucinations. Has a hx of low back pain, takes cox2 inhibitor and muscle relaxant. no other medical problems.

## 2017-10-30 NOTE — ED ADULT NURSE NOTE - OBJECTIVE STATEMENT
Pt came to the ER due to suicidal thoughts. Pt stated that she has been going thru a lot of problems and now she has no place to stay. 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 Pt was tearful during the interview and said she feels suicidal. Pt denied substance and alcohol abuse. Pt is currently on 1:1 for safety. Pt demnied PMHx.

## 2017-10-30 NOTE — ED ADULT NURSE REASSESSMENT NOTE - NS ED NURSE REASSESS COMMENT FT1
When this writer told her that she will be transferred to [facility], she became agitated and eloped from the ED, code gray initiated and pt. was brought back to ED w/ security. Pt. status was changed into involuntary status d/t her s/i. Pt. was given xanax 0.5mg pox 1 dose. maintained on 1:1 CO for safety.

## 2017-10-30 NOTE — ED BEHAVIORAL HEALTH ASSESSMENT NOTE - SUICIDE RISK FACTORS
Perceived burden on family and others/Unable to engage in safety planning/Hopelessness/Mood episode/Highly impulsive behavior/Access to means (pills, firearms, etc.)

## 2017-10-30 NOTE — ED BEHAVIORAL HEALTH ASSESSMENT NOTE - DESCRIPTION
Lives alone,  Tearful but behaviorally calm. LBP Tearful but behaviorally calm, did not require PRNs.

## 2017-10-30 NOTE — ED ADULT NURSE REASSESSMENT NOTE - NS ED NURSE REASSESS COMMENT FT1
received report from outgoing rn regarding pt. profile, plan of care. pt. is calm, awake and remains on 1:1 CO for safety.

## 2017-10-30 NOTE — ED BEHAVIORAL HEALTH NOTE - BEHAVIORAL HEALTH NOTE
Writer saw patient after elopement attempt.  Pt reports signing voluntary paperwork believing she was going to be hospitalized at [facility].  When explained to patient that there is no unit at this hospital, patient became extremely irritable yelling that she has been told that a million times and she is not stupid.  Patient asked whether a bed was available at [place] and that she did not want to go to another hospital.  Pt cut writer off again saying she is not stupid she knows [facility] is a different hospital when writer tried understanding the logic of her previous statement.  Review of records reveal patient with SI and plan to cut self with stockpiled razors for this purpose and alternative planning behaviors such as driving around looking for bridges from which to jump.  Given patient's mood lability and SI with plan, converted patient to involuntary legal status 9.27 given her report that she did not understand the implications of the transfer when she originally signed the voluntary paperwork, yet patient still represents an imminent threat to herself based on SI with plan and preparatory behaviors and impulsive mood lability seen on exam.  Discussed with ED staff who are in agreement and assisted in completing the 2PC.  Called to update SHARIF BAILEY.

## 2017-10-30 NOTE — ED BEHAVIORAL HEALTH ASSESSMENT NOTE - SUMMARY
MO is a 43yo  woman (domiciled, employed, unmarried) with PPHx of depression/anxiety (no hosp, previously on Abilify/Topamax/Prozac, on Klonopin PRN currently) and PMHx of LBP who presented to [facility] ED with worsening SI.    MO is tearful on exam and endorses suicidality in light of pending homelessness and betrayal by friends/family. 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 Has not made attempt, but describes several plans and is unable to contract for safety. As she is amenable to voluntary hospitalization, would benefit from inpatient treatment for safety, stabilization and diagnostic clarity. MO is a 43yo  woman (domiciled, employed, unmarried) with PPHx of depression/anxiety (no hosp, previously on Abilify/Topamax/Prozac, on Klonopin 2mg PRN currently) and PMHx of LBP who presented to [facility] ED with worsening SI.    MO is tearful on exam and endorses suicidality in light of pending homelessness and betrayal by friends/family. 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 Has not yet made attempt, but describes several plans and is unable to contract for safety. As she is amenable to voluntary hospitalization, MO would benefit from inpatient treatment for safety, stabilization and diagnostic clarity.

## 2017-10-30 NOTE — ED BEHAVIORAL HEALTH ASSESSMENT NOTE - CASE SUMMARY
Pt is a 43 y/o SWF presents with worsening depression and anxiety in recent weeks, with recent thoughts of self harm. Pt feels hopeless and helpless with poor support system, and reports poor sleep and appetite, requesting voluntary admission to in psychiatry service for further management of her affective symptoms.

## 2017-10-30 NOTE — ED ADULT NURSE REASSESSMENT NOTE - NS ED NURSE REASSESS COMMENT FT1
pt. was transferred to [facility], Low 6 via ems, calm and cooperative w/ transfer. MERLYN Darby @ [place] [id number] was given an update regarding pt. profile, plan of care and commitment status.

## 2017-10-31 VITALS — TEMPERATURE: 98 F | HEIGHT: 68 IN | WEIGHT: 229.94 LBS

## 2017-10-31 PROCEDURE — 99232 SBSQ HOSP IP/OBS MODERATE 35: CPT

## 2017-10-31 RX ORDER — DIPHENHYDRAMINE HCL 50 MG
50 CAPSULE ORAL ONCE
Qty: 0 | Refills: 0 | Status: DISCONTINUED | OUTPATIENT
Start: 2017-10-31 | End: 2017-11-03

## 2017-10-31 RX ORDER — HALOPERIDOL DECANOATE 100 MG/ML
5 INJECTION INTRAMUSCULAR EVERY 6 HOURS
Qty: 0 | Refills: 0 | Status: DISCONTINUED | OUTPATIENT
Start: 2017-10-31 | End: 2017-11-03

## 2017-10-31 RX ORDER — DIPHENHYDRAMINE HCL 50 MG
50 CAPSULE ORAL AT BEDTIME
Qty: 0 | Refills: 0 | Status: DISCONTINUED | OUTPATIENT
Start: 2017-10-31 | End: 2017-11-03

## 2017-10-31 RX ORDER — IBUPROFEN 200 MG
400 TABLET ORAL
Qty: 0 | Refills: 0 | Status: DISCONTINUED | OUTPATIENT
Start: 2017-10-31 | End: 2017-11-02

## 2017-10-31 RX ORDER — DIPHENHYDRAMINE HCL 50 MG
50 CAPSULE ORAL EVERY 6 HOURS
Qty: 0 | Refills: 0 | Status: DISCONTINUED | OUTPATIENT
Start: 2017-10-31 | End: 2017-11-03

## 2017-10-31 RX ORDER — CLONAZEPAM 1 MG
0.5 TABLET ORAL
Qty: 0 | Refills: 0 | Status: DISCONTINUED | OUTPATIENT
Start: 2017-10-31 | End: 2017-11-03

## 2017-10-31 RX ORDER — HALOPERIDOL DECANOATE 100 MG/ML
5 INJECTION INTRAMUSCULAR ONCE
Qty: 0 | Refills: 0 | Status: DISCONTINUED | OUTPATIENT
Start: 2017-10-31 | End: 2017-11-03

## 2017-10-31 RX ADMIN — Medication 2 MILLIGRAM(S): at 00:20

## 2017-10-31 RX ADMIN — Medication 0.5 MILLIGRAM(S): at 00:20

## 2017-10-31 RX ADMIN — Medication 400 MILLIGRAM(S): at 12:45

## 2017-10-31 RX ADMIN — Medication 400 MILLIGRAM(S): at 14:07

## 2017-10-31 RX ADMIN — Medication 2 MILLIGRAM(S): at 21:30

## 2017-10-31 RX ADMIN — Medication 50 MILLIGRAM(S): at 21:30

## 2017-10-31 RX ADMIN — Medication 50 MILLIGRAM(S): at 00:20

## 2017-10-31 RX ADMIN — Medication 0.5 MILLIGRAM(S): at 12:42

## 2017-11-01 PROCEDURE — 99232 SBSQ HOSP IP/OBS MODERATE 35: CPT

## 2017-11-01 PROCEDURE — 90834 PSYTX W PT 45 MINUTES: CPT

## 2017-11-01 RX ADMIN — Medication 50 MILLIGRAM(S): at 21:45

## 2017-11-01 RX ADMIN — Medication 0.5 MILLIGRAM(S): at 21:45

## 2017-11-01 RX ADMIN — Medication 400 MILLIGRAM(S): at 17:41

## 2017-11-01 RX ADMIN — Medication 400 MILLIGRAM(S): at 16:52

## 2017-11-01 RX ADMIN — Medication 2 MILLIGRAM(S): at 15:22

## 2017-11-01 RX ADMIN — Medication 0.5 MILLIGRAM(S): at 00:00

## 2017-11-01 RX ADMIN — Medication 400 MILLIGRAM(S): at 08:20

## 2017-11-01 RX ADMIN — Medication 400 MILLIGRAM(S): at 09:15

## 2017-11-02 PROCEDURE — 99232 SBSQ HOSP IP/OBS MODERATE 35: CPT

## 2017-11-02 RX ORDER — IBUPROFEN 200 MG
600 TABLET ORAL EVERY 8 HOURS
Qty: 0 | Refills: 0 | Status: DISCONTINUED | OUTPATIENT
Start: 2017-11-02 | End: 2017-11-03

## 2017-11-02 RX ADMIN — Medication 400 MILLIGRAM(S): at 09:01

## 2017-11-02 RX ADMIN — Medication 400 MILLIGRAM(S): at 06:33

## 2017-11-02 RX ADMIN — Medication 50 MILLIGRAM(S): at 21:00

## 2017-11-02 RX ADMIN — Medication 0.5 MILLIGRAM(S): at 21:00

## 2017-11-02 RX ADMIN — Medication 600 MILLIGRAM(S): at 18:20

## 2017-11-02 RX ADMIN — Medication 400 MILLIGRAM(S): at 12:47

## 2017-11-02 RX ADMIN — Medication 600 MILLIGRAM(S): at 17:30

## 2017-11-02 RX ADMIN — Medication 400 MILLIGRAM(S): at 04:43

## 2017-11-02 RX ADMIN — Medication 2 MILLIGRAM(S): at 09:01

## 2017-11-03 VITALS — RESPIRATION RATE: 19 BRPM | TEMPERATURE: 97 F

## 2017-11-03 PROCEDURE — 99232 SBSQ HOSP IP/OBS MODERATE 35: CPT

## 2017-11-07 ENCOUNTER — APPOINTMENT (OUTPATIENT)
Dept: INTERNAL MEDICINE | Facility: CLINIC | Age: 42
End: 2017-11-07
Payer: COMMERCIAL

## 2017-11-07 VITALS
DIASTOLIC BLOOD PRESSURE: 90 MMHG | HEIGHT: 72 IN | HEART RATE: 81 BPM | SYSTOLIC BLOOD PRESSURE: 148 MMHG | OXYGEN SATURATION: 98 %

## 2017-11-07 DIAGNOSIS — R03.0 ELEVATED BLOOD-PRESSURE READING, W/OUT DIAGNOSIS OF HYPERTENSION: ICD-10-CM

## 2017-11-07 DIAGNOSIS — J30.9 ALLERGIC RHINITIS, UNSPECIFIED: ICD-10-CM

## 2017-11-07 PROCEDURE — 99214 OFFICE O/P EST MOD 30 MIN: CPT

## 2017-11-07 RX ORDER — NORGESTIMATE AND ETHINYL ESTRADIOL 0.25-0.035
0.25-35 KIT ORAL
Qty: 84 | Refills: 0 | Status: COMPLETED | COMMUNITY
Start: 2017-06-20 | End: 2017-08-07

## 2017-11-07 RX ORDER — DICLOFENAC SODIUM 100 MG/1
100 TABLET, FILM COATED, EXTENDED RELEASE ORAL
Qty: 30 | Refills: 1 | Status: COMPLETED | COMMUNITY
Start: 2017-03-02 | End: 2017-05-01

## 2017-11-07 RX ORDER — NORGESTIMATE AND ETHINYL ESTRADIOL 7DAYSX3 28
0.18/0.215/0.25 KIT ORAL
Qty: 84 | Refills: 0 | Status: COMPLETED | COMMUNITY
Start: 2016-10-03 | End: 2016-11-07

## 2017-11-07 RX ORDER — ESOMEPRAZOLE MAGNESIUM 20 MG/1
20 CAPSULE, DELAYED RELEASE ORAL
Qty: 30 | Refills: 1 | Status: COMPLETED | COMMUNITY
Start: 2017-03-27 | End: 2017-07-07

## 2017-11-07 RX ORDER — MONTELUKAST 10 MG/1
10 TABLET, FILM COATED ORAL
Qty: 60 | Refills: 4 | Status: COMPLETED | COMMUNITY
Start: 2017-11-07 | End: 2018-09-03

## 2017-11-07 RX ORDER — FLUOXETINE HYDROCHLORIDE 20 MG/1
20 CAPSULE ORAL
Qty: 90 | Refills: 0 | Status: COMPLETED | COMMUNITY
Start: 2017-05-25 | End: 2017-07-07

## 2017-12-01 ENCOUNTER — APPOINTMENT (OUTPATIENT)
Dept: INTERNAL MEDICINE | Facility: CLINIC | Age: 42
End: 2017-12-01
Payer: COMMERCIAL

## 2017-12-01 VITALS — OXYGEN SATURATION: 98 % | TEMPERATURE: 105 F | SYSTOLIC BLOOD PRESSURE: 143 MMHG | DIASTOLIC BLOOD PRESSURE: 90 MMHG

## 2017-12-01 DIAGNOSIS — L63.9 ALOPECIA AREATA, UNSPECIFIED: ICD-10-CM

## 2017-12-01 PROCEDURE — 99212 OFFICE O/P EST SF 10 MIN: CPT

## 2017-12-19 ENCOUNTER — APPOINTMENT (OUTPATIENT)
Dept: INTERNAL MEDICINE | Facility: CLINIC | Age: 42
End: 2017-12-19

## 2018-01-24 ENCOUNTER — RX RENEWAL (OUTPATIENT)
Age: 43
End: 2018-01-24

## 2018-01-25 RX ORDER — IBUPROFEN AND PHENYLEPHRINE HYDROCHLORIDE 200; 10 MG/1; MG/1
10-200 TABLET, FILM COATED ORAL
Refills: 0 | Status: COMPLETED | COMMUNITY
End: 2017-11-15

## 2018-02-27 ENCOUNTER — RX RENEWAL (OUTPATIENT)
Age: 43
End: 2018-02-27

## 2018-03-26 ENCOUNTER — APPOINTMENT (OUTPATIENT)
Dept: INTERNAL MEDICINE | Facility: CLINIC | Age: 43
End: 2018-03-26
Payer: COMMERCIAL

## 2018-03-26 VITALS
DIASTOLIC BLOOD PRESSURE: 94 MMHG | WEIGHT: 260 LBS | BODY MASS INDEX: 35.21 KG/M2 | HEART RATE: 80 BPM | HEIGHT: 72 IN | SYSTOLIC BLOOD PRESSURE: 146 MMHG

## 2018-03-26 DIAGNOSIS — J01.90 ACUTE SINUSITIS, UNSPECIFIED: ICD-10-CM

## 2018-03-26 DIAGNOSIS — N39.0 URINARY TRACT INFECTION, SITE NOT SPECIFIED: ICD-10-CM

## 2018-03-26 DIAGNOSIS — Z87.440 PERSONAL HISTORY OF URINARY (TRACT) INFECTIONS: ICD-10-CM

## 2018-03-26 DIAGNOSIS — B96.89 ACUTE SINUSITIS, UNSPECIFIED: ICD-10-CM

## 2018-03-26 DIAGNOSIS — H65.00 ACUTE SEROUS OTITIS MEDIA, UNSPECIFIED EAR: ICD-10-CM

## 2018-03-26 PROCEDURE — 99214 OFFICE O/P EST MOD 30 MIN: CPT

## 2018-04-02 ENCOUNTER — APPOINTMENT (OUTPATIENT)
Dept: INTERNAL MEDICINE | Facility: CLINIC | Age: 43
End: 2018-04-02

## 2018-05-14 ENCOUNTER — APPOINTMENT (OUTPATIENT)
Dept: INTERNAL MEDICINE | Facility: CLINIC | Age: 43
End: 2018-05-14
Payer: COMMERCIAL

## 2018-05-14 VITALS
OXYGEN SATURATION: 98 % | WEIGHT: 260 LBS | DIASTOLIC BLOOD PRESSURE: 80 MMHG | SYSTOLIC BLOOD PRESSURE: 143 MMHG | HEART RATE: 82 BPM | BODY MASS INDEX: 35.21 KG/M2 | HEIGHT: 72 IN

## 2018-05-14 PROCEDURE — 99214 OFFICE O/P EST MOD 30 MIN: CPT

## 2018-05-14 RX ORDER — TERCONAZOLE 80 MG/1
80 SUPPOSITORY VAGINAL
Qty: 3 | Refills: 0 | Status: COMPLETED | COMMUNITY
Start: 2018-03-28

## 2018-05-22 ENCOUNTER — APPOINTMENT (OUTPATIENT)
Dept: PHYSICAL MEDICINE AND REHAB | Facility: CLINIC | Age: 43
End: 2018-05-22
Payer: COMMERCIAL

## 2018-05-22 VITALS
HEART RATE: 85 BPM | SYSTOLIC BLOOD PRESSURE: 165 MMHG | DIASTOLIC BLOOD PRESSURE: 121 MMHG | TEMPERATURE: 98.1 F | OXYGEN SATURATION: 99 %

## 2018-05-22 DIAGNOSIS — M79.7 FIBROMYALGIA: ICD-10-CM

## 2018-05-22 PROCEDURE — 99214 OFFICE O/P EST MOD 30 MIN: CPT

## 2018-05-29 ENCOUNTER — APPOINTMENT (OUTPATIENT)
Dept: INTERNAL MEDICINE | Facility: CLINIC | Age: 43
End: 2018-05-29

## 2018-06-21 ENCOUNTER — RX RENEWAL (OUTPATIENT)
Age: 43
End: 2018-06-21

## 2018-06-27 ENCOUNTER — APPOINTMENT (OUTPATIENT)
Dept: RHEUMATOLOGY | Facility: CLINIC | Age: 43
End: 2018-06-27

## 2018-07-11 ENCOUNTER — APPOINTMENT (OUTPATIENT)
Dept: RHEUMATOLOGY | Facility: CLINIC | Age: 43
End: 2018-07-11

## 2018-07-12 ENCOUNTER — APPOINTMENT (OUTPATIENT)
Dept: RHEUMATOLOGY | Facility: CLINIC | Age: 43
End: 2018-07-12

## 2018-09-28 ENCOUNTER — RX RENEWAL (OUTPATIENT)
Age: 43
End: 2018-09-28

## 2018-09-28 RX ORDER — AMOXICILLIN AND CLAVULANATE POTASSIUM 875; 125 MG/1; MG/1
875-125 TABLET, COATED ORAL
Qty: 14 | Refills: 0 | Status: COMPLETED | COMMUNITY
Start: 2018-09-28 | End: 2018-10-05

## 2018-10-16 ENCOUNTER — APPOINTMENT (OUTPATIENT)
Dept: PHYSICAL MEDICINE AND REHAB | Facility: CLINIC | Age: 43
End: 2018-10-16
Payer: COMMERCIAL

## 2018-10-16 PROCEDURE — 99214 OFFICE O/P EST MOD 30 MIN: CPT

## 2018-11-16 NOTE — ED ADULT NURSE NOTE - CAS TRG GEN SKIN CONDITION
I still don't have anti-microsomal Ab lab. I have reviewed all others. Please ck on this.    Dry/Warm

## 2019-01-25 ENCOUNTER — RX RENEWAL (OUTPATIENT)
Age: 44
End: 2019-01-25

## 2019-02-21 ENCOUNTER — APPOINTMENT (OUTPATIENT)
Dept: INTERNAL MEDICINE | Facility: CLINIC | Age: 44
End: 2019-02-21

## 2019-02-22 ENCOUNTER — RESULT REVIEW (OUTPATIENT)
Age: 44
End: 2019-02-22

## 2019-03-27 ENCOUNTER — APPOINTMENT (OUTPATIENT)
Dept: RHEUMATOLOGY | Facility: CLINIC | Age: 44
End: 2019-03-27

## 2019-04-24 ENCOUNTER — APPOINTMENT (OUTPATIENT)
Dept: INTERNAL MEDICINE | Facility: CLINIC | Age: 44
End: 2019-04-24

## 2019-05-17 ENCOUNTER — APPOINTMENT (OUTPATIENT)
Dept: INTERNAL MEDICINE | Facility: CLINIC | Age: 44
End: 2019-05-17
Payer: COMMERCIAL

## 2019-05-17 ENCOUNTER — NON-APPOINTMENT (OUTPATIENT)
Age: 44
End: 2019-05-17

## 2019-05-17 VITALS
BODY MASS INDEX: 29.53 KG/M2 | HEART RATE: 89 BPM | TEMPERATURE: 98.2 F | WEIGHT: 218 LBS | SYSTOLIC BLOOD PRESSURE: 140 MMHG | OXYGEN SATURATION: 98 % | HEIGHT: 72 IN | DIASTOLIC BLOOD PRESSURE: 82 MMHG

## 2019-05-17 DIAGNOSIS — Z01.818 ENCOUNTER FOR OTHER PREPROCEDURAL EXAMINATION: ICD-10-CM

## 2019-05-17 DIAGNOSIS — Z87.09 PERSONAL HISTORY OF OTHER DISEASES OF THE RESPIRATORY SYSTEM: ICD-10-CM

## 2019-05-17 DIAGNOSIS — E66.3 OVERWEIGHT: ICD-10-CM

## 2019-05-17 DIAGNOSIS — Z87.2 PERSONAL HISTORY OF DISEASES OF THE SKIN AND SUBCUTANEOUS TISSUE: ICD-10-CM

## 2019-05-17 PROCEDURE — 99215 OFFICE O/P EST HI 40 MIN: CPT

## 2019-05-17 PROCEDURE — 93000 ELECTROCARDIOGRAM COMPLETE: CPT

## 2019-05-17 RX ORDER — FLUOXETINE HYDROCHLORIDE 20 MG/1
20 TABLET ORAL
Qty: 30 | Refills: 0 | Status: COMPLETED | COMMUNITY
Start: 2018-01-29 | End: 2019-05-17

## 2019-05-17 RX ORDER — NYSTATIN AND TRIAMCINOLONE ACETONIDE 100000; 1 MG/G; MG/G
100000-0.1 CREAM TOPICAL
Qty: 30 | Refills: 0 | Status: COMPLETED | COMMUNITY
Start: 2017-01-25 | End: 2019-05-17

## 2019-05-17 RX ORDER — DICLOFENAC SODIUM 100 MG/1
100 TABLET, FILM COATED, EXTENDED RELEASE ORAL
Qty: 30 | Refills: 0 | Status: COMPLETED | COMMUNITY
Start: 2018-05-21 | End: 2019-05-17

## 2019-05-17 RX ORDER — LIDOCAINE 5% 700 MG/1
5 PATCH TOPICAL
Qty: 1 | Refills: 0 | Status: COMPLETED | COMMUNITY
Start: 2017-01-18 | End: 2019-05-17

## 2019-05-17 RX ORDER — GABAPENTIN 300 MG/1
300 CAPSULE ORAL 3 TIMES DAILY
Qty: 90 | Refills: 2 | Status: COMPLETED | COMMUNITY
Start: 2017-11-07 | End: 2019-05-17

## 2019-05-17 RX ORDER — MONTELUKAST 10 MG/1
10 TABLET, FILM COATED ORAL
Qty: 30 | Refills: 11 | Status: COMPLETED | COMMUNITY
Start: 2017-07-18 | End: 2019-05-17

## 2019-05-17 NOTE — REVIEW OF SYSTEMS
[Chest Pain] : no chest pain [Palpitations] : no palpitations [Leg Claudication] : no leg claudication [Paroysmal Nocturnal Dyspnea] : no paroysmal nocturnal dyspnea [Shortness Of Breath] : no shortness of breath [Wheezing] : no wheezing

## 2019-05-17 NOTE — ASSESSMENT
[ECG] : ECG [High Risk Surgery - Intraperitoneal, Intrathoracic or Supringuinal Vascular Procedures] : High Risk Surgery - Intraperitoneal, Intrathoracic or Supringuinal Vascular Procedures - No (0) [Ischemic Heart Disease] : Ischemic Heart Disease - No (0) [Congestive Heart Failure] : Congestive Heart Failure - No (0) [Prior Cerebrovascular Accident or TIA] : Prior Cerebrovascular Accident or TIA - No (0) [Creatinine >= 2mg/dL (1 Point)] : Creatinine >= 2mg/dL - No (0) [Insulin-dependent Diabetic (1 Point)] : Insulin-dependent Diabetic - No (0) [0] : 0 , RCRI Class: I, Risk of Post-Op Cardiac Complications: 0.4%, Procedure Risk: Low-Risk [Patient Optimized for Surgery] : Patient optimized for surgery [As per surgery] : as per surgery
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
## 2019-05-17 NOTE — PHYSICAL EXAM
[Well Nourished] : well nourished [No Acute Distress] : no acute distress [Well-Appearing] : well-appearing [Well Developed] : well developed [Normal Sclera/Conjunctiva] : normal sclera/conjunctiva [PERRL] : pupils equal round and reactive to light [EOMI] : extraocular movements intact [Normal Outer Ear/Nose] : the outer ears and nose were normal in appearance [Normal Oropharynx] : the oropharynx was normal [No JVD] : no jugular venous distention [Supple] : supple [Thyroid Normal, No Nodules] : the thyroid was normal and there were no nodules present [Clear to Auscultation] : lungs were clear to auscultation bilaterally [No Respiratory Distress] : no respiratory distress  [No Lymphadenopathy] : no lymphadenopathy [No Accessory Muscle Use] : no accessory muscle use [Normal Rate] : normal rate  [Regular Rhythm] : with a regular rhythm [No Murmur] : no murmur heard [No Carotid Bruits] : no carotid bruits [Normal S1, S2] : normal S1 and S2 [No Varicosities] : no varicosities [No Abdominal Bruit] : a ~M bruit was not heard ~T in the abdomen [No Edema] : there was no peripheral edema [No Extremity Clubbing/Cyanosis] : no extremity clubbing/cyanosis [Pedal Pulses Present] : the pedal pulses are present [Soft] : abdomen soft [Non Tender] : non-tender [No Palpable Aorta] : no palpable aorta [Non-distended] : non-distended [No HSM] : no HSM [No Masses] : no abdominal mass palpated [Normal Posterior Cervical Nodes] : no posterior cervical lymphadenopathy [Normal Anterior Cervical Nodes] : no anterior cervical lymphadenopathy [Normal Bowel Sounds] : normal bowel sounds [No Joint Swelling] : no joint swelling [No CVA Tenderness] : no CVA  tenderness [No Spinal Tenderness] : no spinal tenderness [No Rash] : no rash [Grossly Normal Strength/Tone] : grossly normal strength/tone [Normal Gait] : normal gait [Coordination Grossly Intact] : coordination grossly intact [No Focal Deficits] : no focal deficits [Normal Affect] : the affect was normal [Deep Tendon Reflexes (DTR)] : deep tendon reflexes were 2+ and symmetric [Normal Insight/Judgement] : insight and judgment were intact

## 2019-05-28 DIAGNOSIS — Z79.1 LONG TERM (CURRENT) USE OF NON-STEROIDAL ANTI-INFLAMMATORIES (NSAID): ICD-10-CM

## 2019-05-28 RX ORDER — MELOXICAM 15 MG/1
15 TABLET ORAL DAILY
Qty: 30 | Refills: 0 | Status: COMPLETED | COMMUNITY
Start: 2018-03-26 | End: 2019-06-27

## 2019-06-24 ENCOUNTER — RX RENEWAL (OUTPATIENT)
Age: 44
End: 2019-06-24

## 2019-06-24 RX ORDER — CLONAZEPAM 2 MG/1
2 TABLET ORAL TWICE DAILY
Qty: 10 | Refills: 0 | Status: COMPLETED | COMMUNITY
Start: 2018-06-21 | End: 2019-06-29

## 2019-07-02 ENCOUNTER — RX RENEWAL (OUTPATIENT)
Age: 44
End: 2019-07-02

## 2019-07-02 DIAGNOSIS — B37.0 CANDIDAL STOMATITIS: ICD-10-CM

## 2019-07-02 RX ORDER — FLUCONAZOLE 100 MG/1
100 TABLET ORAL
Qty: 10 | Refills: 0 | Status: COMPLETED | COMMUNITY
Start: 2019-07-02 | End: 2019-07-09

## 2019-10-29 ENCOUNTER — APPOINTMENT (OUTPATIENT)
Dept: PHYSICAL MEDICINE AND REHAB | Facility: CLINIC | Age: 44
End: 2019-10-29
Payer: COMMERCIAL

## 2019-10-29 VITALS — DIASTOLIC BLOOD PRESSURE: 93 MMHG | OXYGEN SATURATION: 98 % | HEART RATE: 56 BPM | SYSTOLIC BLOOD PRESSURE: 132 MMHG

## 2019-10-29 DIAGNOSIS — M54.5 LOW BACK PAIN: ICD-10-CM

## 2019-10-29 DIAGNOSIS — G89.29 OTHER CHRONIC PAIN: ICD-10-CM

## 2019-10-29 PROCEDURE — 99214 OFFICE O/P EST MOD 30 MIN: CPT

## 2019-10-29 NOTE — ASSESSMENT
[FreeTextEntry1] : - Continue Physical Therapy. \par - Flexeril 10mg at bedtime.\par - If no improvement can consider referral for medical marijuana \par - Continue f/u spine surgeon. \par

## 2019-10-29 NOTE — HISTORY OF PRESENT ILLNESS
[FreeTextEntry1] : Patient S/P lumbar fusion 5/20/19 at [facility].\par Helped with pain down the leg- is "completely gone"- no longer with sharp shooting pain in the leg but still with residual pain in the lower lumbar spine. Pain is hard to describe- in the back but rarely down the leg. \par Not taking any pain meds currently. \par Also with pain in the upper back and cervical spine. Achy in nature. sometimes radiates proximally.

## 2019-10-29 NOTE — PHYSICAL EXAM
[Normal] : Oriented to person, place, and time, insight and judgement were intact and the affect was normal [FreeTextEntry1] : Pain with minimal  ROM lumbar spine. \par Diffuse tenderness in the lumbar paraspinals. \par  [de-identified] : incision intact- decreased ROM lumbar spine.  [de-identified] : decreased lumbar flexion and extension, + tenderness of the upper traps and cervical paraspinals.  [de-identified] : incision of lumbar spine intact [de-identified] : SLow wide based gait.

## 2019-12-17 ENCOUNTER — RX RENEWAL (OUTPATIENT)
Age: 44
End: 2019-12-17

## 2020-02-03 ENCOUNTER — APPOINTMENT (OUTPATIENT)
Dept: INTERNAL MEDICINE | Facility: CLINIC | Age: 45
End: 2020-02-03
Payer: COMMERCIAL

## 2020-02-04 ENCOUNTER — NON-APPOINTMENT (OUTPATIENT)
Age: 45
End: 2020-02-04

## 2020-02-04 ENCOUNTER — APPOINTMENT (OUTPATIENT)
Dept: INTERNAL MEDICINE | Facility: CLINIC | Age: 45
End: 2020-02-04
Payer: COMMERCIAL

## 2020-02-04 VITALS
TEMPERATURE: 98 F | WEIGHT: 216 LBS | BODY MASS INDEX: 29.26 KG/M2 | DIASTOLIC BLOOD PRESSURE: 90 MMHG | HEART RATE: 71 BPM | SYSTOLIC BLOOD PRESSURE: 152 MMHG | HEIGHT: 72 IN | OXYGEN SATURATION: 98 %

## 2020-02-04 VITALS — SYSTOLIC BLOOD PRESSURE: 154 MMHG | DIASTOLIC BLOOD PRESSURE: 100 MMHG

## 2020-02-04 DIAGNOSIS — J98.01 ACUTE BRONCHOSPASM: ICD-10-CM

## 2020-02-04 DIAGNOSIS — R14.0 ABDOMINAL DISTENSION (GASEOUS): ICD-10-CM

## 2020-02-04 PROCEDURE — 99214 OFFICE O/P EST MOD 30 MIN: CPT | Mod: 25

## 2020-02-04 PROCEDURE — 93000 ELECTROCARDIOGRAM COMPLETE: CPT

## 2020-02-04 RX ORDER — METHOCARBAMOL 750 MG/1
750 TABLET, FILM COATED ORAL AT BEDTIME
Qty: 30 | Refills: 3 | Status: COMPLETED | COMMUNITY
Start: 2017-03-09 | End: 2018-11-06

## 2020-02-04 RX ORDER — CYCLOBENZAPRINE HYDROCHLORIDE 10 MG/1
10 TABLET, FILM COATED ORAL
Qty: 30 | Refills: 1 | Status: COMPLETED | COMMUNITY
Start: 2019-10-29 | End: 2020-02-04

## 2020-02-04 RX ORDER — AMITRIPTYLINE HYDROCHLORIDE 25 MG/1
25 TABLET, FILM COATED ORAL
Qty: 30 | Refills: 11 | Status: COMPLETED | COMMUNITY
Start: 2018-10-20 | End: 2018-11-20

## 2020-02-04 RX ORDER — POTASSIUM CHLORIDE 600 MG/1
8 TABLET, FILM COATED, EXTENDED RELEASE ORAL
Qty: 90 | Refills: 3 | Status: COMPLETED | COMMUNITY
Start: 2018-03-26 | End: 2020-02-04

## 2020-02-04 RX ORDER — TRAMADOL HYDROCHLORIDE 50 MG/1
50 TABLET, COATED ORAL
Qty: 40 | Refills: 0 | Status: COMPLETED | COMMUNITY
Start: 2019-10-30

## 2020-02-04 RX ORDER — DEXTROAMPHETAMINE SACCHARATE, AMPHETAMINE ASPARTATE, DEXTROAMPHETAMINE SULFATE AND AMPHETAMINE SULFATE 5; 5; 5; 5 MG/1; MG/1; MG/1; MG/1
20 TABLET ORAL DAILY
Qty: 7 | Refills: 0 | Status: COMPLETED | COMMUNITY
Start: 2018-02-27 | End: 2019-02-04

## 2020-02-04 RX ORDER — HYDROCHLOROTHIAZIDE 25 MG/1
25 TABLET ORAL DAILY
Qty: 90 | Refills: 3 | Status: COMPLETED | COMMUNITY
Start: 2018-03-26 | End: 2019-05-11

## 2020-02-04 RX ORDER — ESOMEPRAZOLE MAGNESIUM 20 MG/1
20 CAPSULE, DELAYED RELEASE ORAL
Qty: 1 | Refills: 1 | Status: COMPLETED | COMMUNITY
Start: 2019-05-28 | End: 2019-06-28

## 2020-02-04 NOTE — PHYSICAL EXAM
[No Acute Distress] : no acute distress [Well Developed] : well developed [Well Nourished] : well nourished [Normal Sclera/Conjunctiva] : normal sclera/conjunctiva [Well-Appearing] : well-appearing [PERRL] : pupils equal round and reactive to light [Normal Oropharynx] : the oropharynx was normal [Normal Outer Ear/Nose] : the outer ears and nose were normal in appearance [EOMI] : extraocular movements intact [No JVD] : no jugular venous distention [No Lymphadenopathy] : no lymphadenopathy [Supple] : supple [Thyroid Normal, No Nodules] : the thyroid was normal and there were no nodules present [No Respiratory Distress] : no respiratory distress  [Clear to Auscultation] : lungs were clear to auscultation bilaterally [Normal Rate] : normal rate  [No Accessory Muscle Use] : no accessory muscle use [Regular Rhythm] : with a regular rhythm [Normal S1, S2] : normal S1 and S2 [No Murmur] : no murmur heard [No Abdominal Bruit] : a ~M bruit was not heard ~T in the abdomen [No Carotid Bruits] : no carotid bruits [No Varicosities] : no varicosities [Pedal Pulses Present] : the pedal pulses are present [No Edema] : there was no peripheral edema [No Palpable Aorta] : no palpable aorta [Non Tender] : non-tender [Soft] : abdomen soft [No Extremity Clubbing/Cyanosis] : no extremity clubbing/cyanosis [No Masses] : no abdominal mass palpated [No HSM] : no HSM [Non-distended] : non-distended [Normal Anterior Cervical Nodes] : no anterior cervical lymphadenopathy [Normal Bowel Sounds] : normal bowel sounds [Normal Posterior Cervical Nodes] : no posterior cervical lymphadenopathy [No CVA Tenderness] : no CVA  tenderness [No Spinal Tenderness] : no spinal tenderness [No Joint Swelling] : no joint swelling [No Rash] : no rash [Grossly Normal Strength/Tone] : grossly normal strength/tone [Coordination Grossly Intact] : coordination grossly intact [No Focal Deficits] : no focal deficits [Normal Gait] : normal gait [Normal Affect] : the affect was normal [Deep Tendon Reflexes (DTR)] : deep tendon reflexes were 2+ and symmetric [Normal Insight/Judgement] : insight and judgment were intact
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
## 2020-02-04 NOTE — ASSESSMENT
[FreeTextEntry1] : Start amlodipine. After 4 days start telmisartan. Stop if eye/mouth swelling\par instructions given to patient HTN Lifestyle modifications

## 2020-02-19 ENCOUNTER — APPOINTMENT (OUTPATIENT)
Dept: PAIN MANAGEMENT | Facility: CLINIC | Age: 45
End: 2020-02-19

## 2020-02-19 ENCOUNTER — APPOINTMENT (OUTPATIENT)
Dept: INTERNAL MEDICINE | Facility: CLINIC | Age: 45
End: 2020-02-19

## 2020-11-02 ENCOUNTER — EMERGENCY (EMERGENCY)
Facility: HOSPITAL | Age: 45
LOS: 1 days | Discharge: HOME | End: 2020-11-02
Attending: EMERGENCY MEDICINE
Payer: MEDICAID

## 2020-11-02 VITALS
OXYGEN SATURATION: 100 % | SYSTOLIC BLOOD PRESSURE: 178 MMHG | DIASTOLIC BLOOD PRESSURE: 104 MMHG | RESPIRATION RATE: 18 BRPM | TEMPERATURE: 98 F | HEART RATE: 66 BPM

## 2020-11-02 VITALS
HEIGHT: 72 IN | HEART RATE: 94 BPM | OXYGEN SATURATION: 97 % | SYSTOLIC BLOOD PRESSURE: 175 MMHG | WEIGHT: 214.95 LBS | RESPIRATION RATE: 18 BRPM | DIASTOLIC BLOOD PRESSURE: 135 MMHG | TEMPERATURE: 99 F

## 2020-11-02 DIAGNOSIS — S12.9XXA FRACTURE OF NECK, UNSPECIFIED, INITIAL ENCOUNTER: ICD-10-CM

## 2020-11-02 DIAGNOSIS — S19.9XXA UNSPECIFIED INJURY OF NECK, INITIAL ENCOUNTER: ICD-10-CM

## 2020-11-02 DIAGNOSIS — M54.2 CERVICALGIA: ICD-10-CM

## 2020-11-02 PROCEDURE — 99243 OFF/OP CNSLTJ NEW/EST LOW 30: CPT

## 2020-11-02 PROCEDURE — 31575 DIAGNOSTIC LARYNGOSCOPY: CPT

## 2020-11-02 PROCEDURE — 99285 EMERGENCY DEPT VISIT HI MDM: CPT | Mod: 25

## 2020-11-02 PROCEDURE — 99284 EMERGENCY DEPT VISIT MOD MDM: CPT

## 2020-11-02 RX ORDER — OXYCODONE 5 MG/1
5 TABLET ORAL
Qty: 30 | Refills: 0 | Status: COMPLETED | COMMUNITY
Start: 2020-11-02 | End: 2020-11-12

## 2020-11-02 RX ORDER — ACETAMINOPHEN 500 MG
650 TABLET ORAL ONCE
Refills: 0 | Status: COMPLETED | OUTPATIENT
Start: 2020-11-02 | End: 2020-11-02

## 2020-11-02 RX ADMIN — Medication 650 MILLIGRAM(S): at 16:10

## 2020-11-02 NOTE — ED PROVIDER NOTE - PATIENT PORTAL LINK FT
You can access the FollowMyHealth Patient Portal offered by [facility] by registering at the following website: [email or web address]. By joining [facility]’s FollowMyHealth portal, you will also be able to view your health information using other applications (apps) compatible with our system.

## 2020-11-02 NOTE — ED PROVIDER NOTE - CLINICAL SUMMARY MEDICAL DECISION MAKING FREE TEXT BOX
46 y/o F w/ PMH anxiety and depression presenting w/ headache, dizziness, and reported abnormal CT. Pt well appearing on exam. Verbal report of ?thyroid cartilage fracture. Pt's PCP Dr. Turner called ahead and requested ENT consult. Will provide tylenol for analgesia and speak w/ ENT to potentially scope pt. Pt may require additional imaging. Will defer labs at this time. Will reassess the need for additional interventions as clinically warranted.
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
## 2020-11-02 NOTE — ED PROVIDER NOTE - SHIFT CHANGE DETAILS
João Ibarra MD FACEP note of transfer at the usual time of sign out: Receiving team will follow up on labs, analgesia, any clinical imaging, reassess and disposition as clinically indicated.  Details of patient and plan conveyed to receiving physician and conveyed back for understanding.  There were no questions at this time about the patient's status, disposition, and plan. Patient's care to be taken over by receiving physician at this time, all decisions regarding the progression of care will be made at their discretion.
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
## 2020-11-02 NOTE — ED PROVIDER NOTE - CARE PROVIDER_API CALL
Kisha Cabrera (MD)  Otolaryngology Facial Plastics  [address] [place] [address] [address]  [place] [address]  Phone: [phone]  Fax: [phone]  Follow Up Time: Routine

## 2020-11-02 NOTE — ED PROVIDER NOTE - ATTENDING CONTRIBUTION TO CARE
João Ibarra MD, FACEP   Patient  understands anticipatory guidance and was given strict return and follow up precautions. The patient has been informed of all concerning signs and symptoms to return to Emergency Department, the necessity to follow up with PMD/Clinic/follow up provided within 2-3 days was explained, and the patient reports understanding of above with capacity and insight if patient disposition is to be home.
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
## 2020-11-03 DIAGNOSIS — S19.9XXA UNSPECIFIED INJURY OF NECK, INITIAL ENCOUNTER: ICD-10-CM

## 2020-11-05 ENCOUNTER — NON-APPOINTMENT (OUTPATIENT)
Age: 45
End: 2020-11-05

## 2020-11-10 ENCOUNTER — NON-APPOINTMENT (OUTPATIENT)
Age: 45
End: 2020-11-10

## 2020-11-10 ENCOUNTER — APPOINTMENT (OUTPATIENT)
Dept: NEUROLOGY | Facility: CLINIC | Age: 45
End: 2020-11-10
Payer: COMMERCIAL

## 2020-11-10 VITALS
WEIGHT: 216 LBS | DIASTOLIC BLOOD PRESSURE: 119 MMHG | HEIGHT: 72 IN | SYSTOLIC BLOOD PRESSURE: 175 MMHG | HEART RATE: 67 BPM | BODY MASS INDEX: 29.26 KG/M2

## 2020-11-10 VITALS — TEMPERATURE: 97.3 F

## 2020-11-10 DIAGNOSIS — M54.16 RADICULOPATHY, LUMBAR REGION: ICD-10-CM

## 2020-11-10 PROCEDURE — 99243 OFF/OP CNSLTJ NEW/EST LOW 30: CPT
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
## 2020-11-10 NOTE — CONSULT LETTER
[Dear  ___] : Dear  [unfilled], [Consult Letter:] : I had the pleasure of evaluating your patient, [unfilled]. [Please see my note below.] : Please see my note below. [Consult Closing:] : Thank you very much for allowing me to participate in the care of this patient.  If you have any questions, please do not hesitate to contact me. [Sincerely,] : Sincerely, [FreeTextEntry2] : Aden Turner M.D.

## 2020-11-12 ENCOUNTER — NON-APPOINTMENT (OUTPATIENT)
Age: 45
End: 2020-11-12

## 2020-11-16 ENCOUNTER — NON-APPOINTMENT (OUTPATIENT)
Age: 45
End: 2020-11-16

## 2020-11-17 ENCOUNTER — APPOINTMENT (OUTPATIENT)
Dept: OTOLARYNGOLOGY | Facility: CLINIC | Age: 45
End: 2020-11-17
Payer: COMMERCIAL

## 2020-11-17 ENCOUNTER — NON-APPOINTMENT (OUTPATIENT)
Age: 45
End: 2020-11-17

## 2020-11-17 VITALS
WEIGHT: 220 LBS | SYSTOLIC BLOOD PRESSURE: 160 MMHG | HEIGHT: 72 IN | HEART RATE: 79 BPM | BODY MASS INDEX: 29.8 KG/M2 | DIASTOLIC BLOOD PRESSURE: 113 MMHG

## 2020-11-17 DIAGNOSIS — S12.8XXA FRACTURE OF OTHER PARTS OF NECK, INITIAL ENCOUNTER: ICD-10-CM

## 2020-11-17 DIAGNOSIS — R49.9 UNSPECIFIED VOICE AND RESONANCE DISORDER: ICD-10-CM

## 2020-11-17 PROCEDURE — 31575 DIAGNOSTIC LARYNGOSCOPY: CPT

## 2020-11-17 PROCEDURE — 99244 OFF/OP CNSLTJ NEW/EST MOD 40: CPT | Mod: 25

## 2020-11-17 NOTE — ASSESSMENT
[FreeTextEntry1] : Pt's films were not available for review today.  Pt is going to try to get them to me.  Her laryngeal exam is fairly normal today.  Even if she had a fracture, it has been 3 weeks and surgical intervention would not be likely.

## 2020-11-17 NOTE — REVIEW OF SYSTEMS
[Sneezing] : sneezing [Seasonal Allergies] : seasonal allergies [Post Nasal Drip] : post nasal drip [Ear Pain] : ear pain [Ear Itch] : ear itch [Dizziness] : dizziness [Vertigo] : vertigo [Lightheadedness] : lightheadedness [Nasal Congestion] : nasal congestion [Recurrent Sinus Infections] : recurrent sinus infections [Sinus Pain] : sinus pain [Sinus Pressure] : sinus pressure [Hoarseness] : hoarseness [Throat Clearing] : throat clearing [Throat Pain] : throat pain [Throat Dryness] : throat dryness [Throat Itching] : throat itching [Swelling Neck] : swelling neck [Swelling Face] : face swelling [Feeling Tired] : feeling tired [Eye Pain] : eye pain [Cough] : cough [Heartburn] : heartburn [Anxiety] : anxiety [Depression] : depression [Easy Bruising] : a tendency for easy bruising [Negative] : Endocrine

## 2020-11-17 NOTE — PROCEDURE
[Unable to Cooperate with Mirror] : patient unable to cooperate with mirror [Complicated Symptoms] : complicated symptoms requiring more thorough examination than provided by mirror [Topical Lidocaine] : topical lidocaine [Oxymetazoline HCl] : oxymetazoline HCl [Flexible Endoscope] : examined with the flexible endoscope [Serial Number: ___] : Serial Number: [unfilled] [Normal] : the false vocal folds were pink and regular, the ventricular sulcus was open, the true vocal folds were glistening white, tense and of equal length, mobility, and height [True Vocal Cords Paralysis] : no true vocal cord paralysis [True Vocal Cords Erythematous] : no true vocal cord edema [True Vocal Cords Sanderson's Nodules] : no true vocal cord nodules [Glottis Arytenoid Cartilages] : no arytenoid granulomas [Glottis Arytenoid Cartilages Erythema] : no arytenoid erythema

## 2020-11-17 NOTE — CONSULT LETTER
[Dear  ___] : Dear  [unfilled], [Consult Letter:] : I had the pleasure of evaluating your patient, [unfilled]. [Please see my note below.] : Please see my note below. [Consult Closing:] : Thank you very much for allowing me to participate in the care of this patient.  If you have any questions, please do not hesitate to contact me. [Sincerely,] : Sincerely, [FreeTextEntry2] : Aden Turner MD [FreeTextEntry3] : Jayjay Ba MD, FACS\par Chief of Otolaryngology [facility]\par  - Dept. of Otolaryngology\par [employer]\par \par

## 2020-11-17 NOTE — REASON FOR VISIT
[Initial Consultation] : an initial consultation for [FreeTextEntry2] : voice box cartilage fracture [FreeTextEntry1] : Referred by Dr. Aden Turner PCP
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
## 2020-11-24 RX ORDER — TELMISARTAN 40 MG/1
40 TABLET ORAL
Qty: 30 | Refills: 1 | Status: DISCONTINUED | COMMUNITY
Start: 2020-02-04 | End: 2020-11-24

## 2020-12-01 ENCOUNTER — NON-APPOINTMENT (OUTPATIENT)
Age: 45
End: 2020-12-01

## 2020-12-14 ENCOUNTER — NON-APPOINTMENT (OUTPATIENT)
Age: 45
End: 2020-12-14

## 2020-12-16 DIAGNOSIS — F07.81 POSTCONCUSSIONAL SYNDROME: ICD-10-CM

## 2020-12-16 DIAGNOSIS — R51.9 HEADACHE, UNSPECIFIED: ICD-10-CM

## 2020-12-16 DIAGNOSIS — G44.309 POST-TRAUMATIC HEADACHE, UNSPECIFIED, NOT INTRACTABLE: ICD-10-CM

## 2020-12-23 ENCOUNTER — APPOINTMENT (OUTPATIENT)
Dept: MRI IMAGING | Facility: CLINIC | Age: 45
End: 2020-12-23

## 2021-02-16 ENCOUNTER — NON-APPOINTMENT (OUTPATIENT)
Age: 46
End: 2021-02-16

## 2021-03-04 ENCOUNTER — NON-APPOINTMENT (OUTPATIENT)
Age: 46
End: 2021-03-04

## 2021-06-14 ENCOUNTER — RX RENEWAL (OUTPATIENT)
Age: 46
End: 2021-06-14

## 2021-06-17 ENCOUNTER — NON-APPOINTMENT (OUTPATIENT)
Age: 46
End: 2021-06-17

## 2021-06-18 ENCOUNTER — NON-APPOINTMENT (OUTPATIENT)
Age: 46
End: 2021-06-18

## 2021-10-05 ENCOUNTER — APPOINTMENT (OUTPATIENT)
Dept: INTERNAL MEDICINE | Facility: CLINIC | Age: 46
End: 2021-10-05
Payer: SELF-PAY

## 2021-10-05 DIAGNOSIS — S09.90XS UNSPECIFIED INJURY OF HEAD, SEQUELA: ICD-10-CM

## 2021-10-05 PROCEDURE — 99443: CPT

## 2021-10-20 ENCOUNTER — APPOINTMENT (OUTPATIENT)
Dept: INTERNAL MEDICINE | Facility: CLINIC | Age: 46
End: 2021-10-20
Payer: COMMERCIAL

## 2021-10-20 VITALS
HEART RATE: 81 BPM | TEMPERATURE: 97.6 F | BODY MASS INDEX: 31.83 KG/M2 | WEIGHT: 235 LBS | DIASTOLIC BLOOD PRESSURE: 88 MMHG | OXYGEN SATURATION: 97 % | SYSTOLIC BLOOD PRESSURE: 145 MMHG | HEIGHT: 72 IN

## 2021-10-20 VITALS — DIASTOLIC BLOOD PRESSURE: 86 MMHG | SYSTOLIC BLOOD PRESSURE: 126 MMHG

## 2021-10-20 DIAGNOSIS — Z23 ENCOUNTER FOR IMMUNIZATION: ICD-10-CM

## 2021-10-20 DIAGNOSIS — Z48.02 ENCOUNTER FOR REMOVAL OF SUTURES: ICD-10-CM

## 2021-10-20 PROCEDURE — 99214 OFFICE O/P EST MOD 30 MIN: CPT

## 2021-10-20 RX ORDER — HYDROCHLOROTHIAZIDE 25 MG/1
25 TABLET ORAL DAILY
Qty: 90 | Refills: 3 | Status: DISCONTINUED | COMMUNITY
Start: 2020-11-24 | End: 2021-10-20

## 2021-10-20 NOTE — ASSESSMENT
[FreeTextEntry1] : chlorthalidone take place of HCTZ  DBP elevated\par CPE\par staples removed\par BP OK despite ADDERAL  see Dr Zepeda

## 2021-10-20 NOTE — REVIEW OF SYSTEMS
[Chest Pain] : no chest pain [Palpitations] : no palpitations [Leg Claudication] : no leg claudication [Orthopnea] : no orthopnea [Paroxysmal Nocturnal Dyspnea] : no paroxysmal nocturnal dyspnea [Shortness Of Breath] : shortness of breath [Wheezing] : no wheezing [Cough] : no cough
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
## 2021-10-20 NOTE — PHYSICAL EXAM
[No Acute Distress] : no acute distress [Well Nourished] : well nourished [Well Developed] : well developed [Well-Appearing] : well-appearing [Normal Sclera/Conjunctiva] : normal sclera/conjunctiva [PERRL] : pupils equal round and reactive to light [EOMI] : extraocular movements intact [Normal Outer Ear/Nose] : the outer ears and nose were normal in appearance [Normal Oropharynx] : the oropharynx was normal [No JVD] : no jugular venous distention [No Lymphadenopathy] : no lymphadenopathy [Supple] : supple [Thyroid Normal, No Nodules] : the thyroid was normal and there were no nodules present [No Respiratory Distress] : no respiratory distress  [No Accessory Muscle Use] : no accessory muscle use [Clear to Auscultation] : lungs were clear to auscultation bilaterally [Normal Rate] : normal rate  [Regular Rhythm] : with a regular rhythm [Normal S1, S2] : normal S1 and S2 [No Murmur] : no murmur heard [No Carotid Bruits] : no carotid bruits [No Abdominal Bruit] : a ~M bruit was not heard ~T in the abdomen [No Varicosities] : no varicosities [Pedal Pulses Present] : the pedal pulses are present [No Edema] : there was no peripheral edema [No Palpable Aorta] : no palpable aorta [No Extremity Clubbing/Cyanosis] : no extremity clubbing/cyanosis [Soft] : abdomen soft [Non Tender] : non-tender [Non-distended] : non-distended [No Masses] : no abdominal mass palpated [No HSM] : no HSM [Normal Bowel Sounds] : normal bowel sounds [Normal Posterior Cervical Nodes] : no posterior cervical lymphadenopathy [Normal Anterior Cervical Nodes] : no anterior cervical lymphadenopathy [No CVA Tenderness] : no CVA  tenderness [No Spinal Tenderness] : no spinal tenderness [No Joint Swelling] : no joint swelling [Grossly Normal Strength/Tone] : grossly normal strength/tone [No Rash] : no rash [Coordination Grossly Intact] : coordination grossly intact [No Focal Deficits] : no focal deficits [Normal Gait] : normal gait [Deep Tendon Reflexes (DTR)] : deep tendon reflexes were 2+ and symmetric [Normal Affect] : the affect was normal [Normal Insight/Judgement] : insight and judgment were intact [de-identified] : 4 staples REMOVED  NO COMPLICATIONS

## 2021-11-02 ENCOUNTER — TRANSCRIPTION ENCOUNTER (OUTPATIENT)
Age: 46
End: 2021-11-02

## 2021-11-03 ENCOUNTER — TRANSCRIPTION ENCOUNTER (OUTPATIENT)
Age: 46
End: 2021-11-03

## 2021-11-08 DIAGNOSIS — F43.10 POST-TRAUMATIC STRESS DISORDER, UNSPECIFIED: ICD-10-CM

## 2021-11-18 ENCOUNTER — TRANSCRIPTION ENCOUNTER (OUTPATIENT)
Age: 46
End: 2021-11-18

## 2021-12-07 ENCOUNTER — APPOINTMENT (OUTPATIENT)
Dept: INTERNAL MEDICINE | Facility: CLINIC | Age: 46
End: 2021-12-07

## 2021-12-07 ENCOUNTER — TRANSCRIPTION ENCOUNTER (OUTPATIENT)
Age: 46
End: 2021-12-07

## 2021-12-08 ENCOUNTER — APPOINTMENT (OUTPATIENT)
Dept: INTERNAL MEDICINE | Facility: CLINIC | Age: 46
End: 2021-12-08

## 2021-12-08 ENCOUNTER — TRANSCRIPTION ENCOUNTER (OUTPATIENT)
Age: 46
End: 2021-12-08

## 2021-12-08 DIAGNOSIS — M94.0 CHONDROCOSTAL JUNCTION SYNDROME [TIETZE]: ICD-10-CM

## 2021-12-08 DIAGNOSIS — F41.9 ANXIETY DISORDER, UNSPECIFIED: ICD-10-CM

## 2021-12-08 DIAGNOSIS — M54.12 RADICULOPATHY, CERVICAL REGION: ICD-10-CM

## 2021-12-08 DIAGNOSIS — S06.0X9A CONCUSSION WITH LOSS OF CONSCIOUSNESS OF UNSPECIFIED DURATION, INITIAL ENCOUNTER: ICD-10-CM

## 2021-12-10 ENCOUNTER — TRANSCRIPTION ENCOUNTER (OUTPATIENT)
Age: 46
End: 2021-12-10

## 2021-12-15 ENCOUNTER — TRANSCRIPTION ENCOUNTER (OUTPATIENT)
Age: 46
End: 2021-12-15

## 2021-12-17 ENCOUNTER — TRANSCRIPTION ENCOUNTER (OUTPATIENT)
Age: 46
End: 2021-12-17

## 2021-12-20 ENCOUNTER — APPOINTMENT (OUTPATIENT)
Dept: INTERNAL MEDICINE | Facility: CLINIC | Age: 46
End: 2021-12-20

## 2021-12-30 ENCOUNTER — TRANSCRIPTION ENCOUNTER (OUTPATIENT)
Age: 46
End: 2021-12-30

## 2022-01-01 ENCOUNTER — TRANSCRIPTION ENCOUNTER (OUTPATIENT)
Age: 47
End: 2022-01-01

## 2022-01-05 ENCOUNTER — TRANSCRIPTION ENCOUNTER (OUTPATIENT)
Age: 47
End: 2022-01-05

## 2022-01-13 ENCOUNTER — TRANSCRIPTION ENCOUNTER (OUTPATIENT)
Age: 47
End: 2022-01-13

## 2022-01-20 ENCOUNTER — TRANSCRIPTION ENCOUNTER (OUTPATIENT)
Age: 47
End: 2022-01-20

## 2022-01-25 NOTE — CONSULT NOTE ADULT - PROBLEM/RECOMMENDATION-1
Order for nebulizer supplies faxed to  [facility] per patient request.   DISPLAY PLAN FREE TEXT

## 2022-01-26 ENCOUNTER — TRANSCRIPTION ENCOUNTER (OUTPATIENT)
Age: 47
End: 2022-01-26

## 2022-01-27 ENCOUNTER — TRANSCRIPTION ENCOUNTER (OUTPATIENT)
Age: 47
End: 2022-01-27

## 2022-01-31 ENCOUNTER — TRANSCRIPTION ENCOUNTER (OUTPATIENT)
Age: 47
End: 2022-01-31

## 2022-02-09 ENCOUNTER — TRANSCRIPTION ENCOUNTER (OUTPATIENT)
Age: 47
End: 2022-02-09

## 2022-02-22 ENCOUNTER — APPOINTMENT (OUTPATIENT)
Dept: INTERNAL MEDICINE | Facility: CLINIC | Age: 47
End: 2022-02-22

## 2022-02-23 ENCOUNTER — APPOINTMENT (OUTPATIENT)
Dept: INTERNAL MEDICINE | Facility: CLINIC | Age: 47
End: 2022-02-23
Payer: MEDICAID

## 2022-02-23 DIAGNOSIS — J34.89 OTHER SPECIFIED DISORDERS OF NOSE AND NASAL SINUSES: ICD-10-CM

## 2022-02-23 DIAGNOSIS — R09.82 POSTNASAL DRIP: ICD-10-CM

## 2022-02-23 PROCEDURE — 99443: CPT

## 2022-02-24 ENCOUNTER — TRANSCRIPTION ENCOUNTER (OUTPATIENT)
Age: 47
End: 2022-02-24

## 2022-03-25 ENCOUNTER — TRANSCRIPTION ENCOUNTER (OUTPATIENT)
Age: 47
End: 2022-03-25

## 2022-03-29 NOTE — ED PROVIDER NOTE - NSFOLLOWUPCLINICS_GEN_ALL_ED_FT
[Follow-Up: _____] : a [unfilled] follow-up visit [facility]  Neurology  [address] [place] [address] [address] [place] [address]  Phone: [phone]  Fax:   Follow Up Time: Routine

## 2022-04-19 ENCOUNTER — TRANSCRIPTION ENCOUNTER (OUTPATIENT)
Age: 47
End: 2022-04-19

## 2022-04-20 ENCOUNTER — TRANSCRIPTION ENCOUNTER (OUTPATIENT)
Age: 47
End: 2022-04-20

## 2022-04-29 ENCOUNTER — APPOINTMENT (OUTPATIENT)
Dept: NEUROLOGY | Facility: CLINIC | Age: 47
End: 2022-04-29

## 2022-05-02 ENCOUNTER — TRANSCRIPTION ENCOUNTER (OUTPATIENT)
Age: 47
End: 2022-05-02

## 2022-05-03 ENCOUNTER — TRANSCRIPTION ENCOUNTER (OUTPATIENT)
Age: 47
End: 2022-05-03

## 2022-05-10 ENCOUNTER — TRANSCRIPTION ENCOUNTER (OUTPATIENT)
Age: 47
End: 2022-05-10

## 2022-05-11 ENCOUNTER — TRANSCRIPTION ENCOUNTER (OUTPATIENT)
Age: 47
End: 2022-05-11

## 2022-05-31 ENCOUNTER — TRANSCRIPTION ENCOUNTER (OUTPATIENT)
Age: 47
End: 2022-05-31

## 2022-05-31 ENCOUNTER — APPOINTMENT (OUTPATIENT)
Dept: INTERNAL MEDICINE | Facility: CLINIC | Age: 47
End: 2022-05-31

## 2022-06-01 ENCOUNTER — APPOINTMENT (OUTPATIENT)
Dept: INTERNAL MEDICINE | Facility: CLINIC | Age: 47
End: 2022-06-01

## 2022-06-01 ENCOUNTER — NON-APPOINTMENT (OUTPATIENT)
Age: 47
End: 2022-06-01

## 2022-06-01 DIAGNOSIS — E78.5 HYPERLIPIDEMIA, UNSPECIFIED: ICD-10-CM

## 2022-06-01 DIAGNOSIS — Z12.11 ENCOUNTER FOR SCREENING FOR MALIGNANT NEOPLASM OF COLON: ICD-10-CM

## 2022-06-03 NOTE — HISTORY OF PRESENT ILLNESS
[FreeTextEntry1] : Management HTN   NO SHOW [de-identified] : 46 yo ADD, concussions, Obesity, HTN, lumbar spondylosis  lumbar fusion [id number]\par Cervical pain  NSAID\par depression, anxiety, ADD\par chlorthalidone amlodipine telmisartan\par \par Advised neuropsych for ADD

## 2022-06-20 ENCOUNTER — TRANSCRIPTION ENCOUNTER (OUTPATIENT)
Age: 47
End: 2022-06-20

## 2022-06-22 ENCOUNTER — TRANSCRIPTION ENCOUNTER (OUTPATIENT)
Age: 47
End: 2022-06-22

## 2022-08-09 ENCOUNTER — APPOINTMENT (OUTPATIENT)
Dept: INTERNAL MEDICINE | Facility: CLINIC | Age: 47
End: 2022-08-09

## 2023-12-27 NOTE — ED ADULT TRIAGE NOTE - BP NONINVASIVE SYSTOLIC (MM HG)
CC:  Ghislaine Price is here today for Office Visit and Consultation (IUD removal)       Medications have been reviewed and updated    Patient preferred phone number: [phone]  Ok to leave detailed message on [employer] and Prefers communication and results via [employer] Maintenance Due   Topic Date Due    Hepatitis B Vaccine (1 of 3 - 3-dose series) Never done    COVID-19 Vaccine (1) Never done    Influenza Vaccine (1) 09/01/2023       Patient is due for topics as listed above but is not proceeding with Immunization(s) COVID-19, Hep B, and Influenza at this time.    193

## 2024-01-12 RX ORDER — FLUOXETINE HYDROCHLORIDE 20 MG/1
20 TABLET ORAL DAILY
Qty: 90 | Refills: 3 | Status: ACTIVE | COMMUNITY
Start: 2024-01-12 | End: 1900-01-01

## 2024-01-16 ENCOUNTER — APPOINTMENT (OUTPATIENT)
Dept: INTERNAL MEDICINE | Facility: CLINIC | Age: 49
End: 2024-01-16
Payer: MEDICAID

## 2024-01-16 ENCOUNTER — NON-APPOINTMENT (OUTPATIENT)
Age: 49
End: 2024-01-16

## 2024-01-16 VITALS — DIASTOLIC BLOOD PRESSURE: 100 MMHG | SYSTOLIC BLOOD PRESSURE: 140 MMHG

## 2024-01-16 VITALS
DIASTOLIC BLOOD PRESSURE: 100 MMHG | HEART RATE: 86 BPM | SYSTOLIC BLOOD PRESSURE: 150 MMHG | WEIGHT: 244 LBS | BODY MASS INDEX: 33.05 KG/M2 | OXYGEN SATURATION: 98 % | HEIGHT: 72 IN

## 2024-01-16 DIAGNOSIS — F98.8 OTHER SPECIFIED BEHAVIORAL AND EMOTIONAL DISORDERS WITH ONSET USUALLY OCCURRING IN CHILDHOOD AND ADOLESCENCE: ICD-10-CM

## 2024-01-16 DIAGNOSIS — R07.9 CHEST PAIN, UNSPECIFIED: ICD-10-CM

## 2024-01-16 DIAGNOSIS — Z00.00 ENCOUNTER FOR GENERAL ADULT MEDICAL EXAMINATION W/OUT ABNORMAL FINDINGS: ICD-10-CM

## 2024-01-16 DIAGNOSIS — I10 ESSENTIAL (PRIMARY) HYPERTENSION: ICD-10-CM

## 2024-01-16 DIAGNOSIS — R94.31 ABNORMAL ELECTROCARDIOGRAM [ECG] [EKG]: ICD-10-CM

## 2024-01-16 PROCEDURE — 93010 ELECTROCARDIOGRAM REPORT: CPT

## 2024-01-16 PROCEDURE — 99214 OFFICE O/P EST MOD 30 MIN: CPT | Mod: 25

## 2024-01-16 RX ORDER — FLUOXETINE HYDROCHLORIDE 40 MG/1
40 CAPSULE ORAL
Qty: 90 | Refills: 3 | Status: ACTIVE | COMMUNITY
Start: 2020-09-10 | End: 1900-01-01

## 2024-01-16 RX ORDER — ALBUTEROL SULFATE 90 UG/1
108 (90 BASE) AEROSOL, METERED RESPIRATORY (INHALATION) EVERY 4 HOURS
Qty: 1 | Refills: 11 | Status: ACTIVE | COMMUNITY
Start: 2017-07-18 | End: 1900-01-01

## 2024-01-16 RX ORDER — MELOXICAM 15 MG/1
15 TABLET ORAL DAILY
Qty: 30 | Refills: 3 | Status: COMPLETED | COMMUNITY
Start: 2020-02-04 | End: 2024-01-16

## 2024-01-16 RX ORDER — DICLOFENAC SODIUM 10 MG/G
1 GEL TOPICAL
Qty: 1 | Refills: 11 | Status: COMPLETED | COMMUNITY
Start: 2020-02-04 | End: 2024-01-16

## 2024-01-16 RX ORDER — FLUTICASONE PROPIONATE 50 UG/1
50 SPRAY, METERED NASAL
Qty: 1 | Refills: 10 | Status: ACTIVE | COMMUNITY
Start: 2022-02-23 | End: 1900-01-01

## 2024-01-16 RX ORDER — DEXTROAMPHETAMINE SACCHARATE, AMPHETAMINE ASPARTATE, DEXTROAMPHETAMINE SULFATE AND AMPHETAMINE SULFATE 5; 5; 5; 5 MG/1; MG/1; MG/1; MG/1
20 TABLET ORAL DAILY
Qty: 30 | Refills: 0 | Status: COMPLETED | COMMUNITY
Start: 2018-06-21 | End: 2024-01-16

## 2024-01-16 RX ORDER — CLOTRIMAZOLE 10 MG/1
10 LOZENGE ORAL
Qty: 1 | Refills: 0 | Status: COMPLETED | COMMUNITY
Start: 2019-07-02 | End: 2024-01-16

## 2024-01-16 RX ORDER — OMEPRAZOLE 20 MG/1
20 CAPSULE, DELAYED RELEASE ORAL
Qty: 1 | Refills: 3 | Status: ACTIVE | COMMUNITY
Start: 2024-01-16 | End: 2025-01-10

## 2024-01-16 RX ORDER — DICLOFENAC SODIUM 10 MG/G
1 GEL TOPICAL DAILY
Qty: 1 | Refills: 11 | Status: COMPLETED | COMMUNITY
Start: 2017-01-18 | End: 2024-01-16
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
## 2024-01-30 ENCOUNTER — APPOINTMENT (OUTPATIENT)
Dept: INTERNAL MEDICINE | Facility: CLINIC | Age: 49
End: 2024-01-30

## 2024-02-21 RX ORDER — AMLODIPINE BESYLATE 5 MG/1
5 TABLET ORAL
Qty: 90 | Refills: 3 | Status: ACTIVE | COMMUNITY
Start: 2020-02-04 | End: 1900-01-01

## 2024-02-21 RX ORDER — TELMISARTAN 80 MG/1
80 TABLET ORAL DAILY
Qty: 90 | Refills: 3 | Status: ACTIVE | COMMUNITY
Start: 2020-11-24 | End: 1900-01-01

## 2024-02-21 RX ORDER — CHLORTHALIDONE 25 MG/1
25 TABLET ORAL
Qty: 90 | Refills: 3 | Status: ACTIVE | COMMUNITY
Start: 2021-10-20 | End: 1900-01-01

## 2024-02-23 RX ORDER — CLONAZEPAM 2 MG/1
2 TABLET ORAL
Qty: 60 | Refills: 0 | Status: ACTIVE | COMMUNITY
Start: 2017-07-18 | End: 1900-01-01

## 2024-02-28 ENCOUNTER — APPOINTMENT (OUTPATIENT)
Dept: INTERNAL MEDICINE | Facility: CLINIC | Age: 49
End: 2024-02-28

## 2024-03-29 ENCOUNTER — APPOINTMENT (OUTPATIENT)
Dept: INTERNAL MEDICINE | Facility: CLINIC | Age: 49
End: 2024-03-29

## 2024-04-03 ENCOUNTER — APPOINTMENT (OUTPATIENT)
Dept: CARDIOLOGY | Facility: CLINIC | Age: 49
End: 2024-04-03

## 2024-05-24 DIAGNOSIS — Z91.89 OTHER SPECIFIED PERSONAL RISK FACTORS, NOT ELSEWHERE CLASSIFIED: ICD-10-CM
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
## 2024-08-02 ENCOUNTER — APPOINTMENT (OUTPATIENT)
Dept: INTERNAL MEDICINE | Facility: CLINIC | Age: 49
End: 2024-08-02

## 2024-08-07 PROBLEM — M47.817 FACET HYPERTROPHY OF LUMBOSACRAL REGION: Status: ACTIVE | Noted: 2024-08-07

## 2024-08-07 PROBLEM — M47.816 FACET HYPERTROPHY OF LUMBAR REGION: Status: ACTIVE | Noted: 2024-08-07

## 2024-08-26 ENCOUNTER — APPOINTMENT (OUTPATIENT)
Dept: INTERNAL MEDICINE | Facility: CLINIC | Age: 49
End: 2024-08-26

## 2024-11-04 ENCOUNTER — NON-APPOINTMENT (OUTPATIENT)
Age: 49
End: 2024-11-04

## 2024-11-12 ENCOUNTER — APPOINTMENT (OUTPATIENT)
Dept: INTERNAL MEDICINE | Facility: CLINIC | Age: 49
End: 2024-11-12
Payer: MEDICAID

## 2024-11-12 ENCOUNTER — OUTPATIENT (OUTPATIENT)
Dept: OUTPATIENT SERVICES | Facility: HOSPITAL | Age: 49
LOS: 1 days | End: 2024-11-12
Payer: MEDICAID

## 2024-11-12 VITALS
OXYGEN SATURATION: 96 % | DIASTOLIC BLOOD PRESSURE: 80 MMHG | HEART RATE: 96 BPM | HEIGHT: 72 IN | BODY MASS INDEX: 33.86 KG/M2 | SYSTOLIC BLOOD PRESSURE: 120 MMHG | WEIGHT: 250 LBS

## 2024-11-12 DIAGNOSIS — T73.2XXS EXHAUSTION DUE TO EXPOSURE, SEQUELA: ICD-10-CM

## 2024-11-12 DIAGNOSIS — M47.816 SPONDYLOSIS WITHOUT MYELOPATHY OR RADICULOPATHY, LUMBAR REGION: ICD-10-CM

## 2024-11-12 DIAGNOSIS — E66.9 OBESITY, UNSPECIFIED: ICD-10-CM

## 2024-11-12 DIAGNOSIS — K59.09 OTHER CONSTIPATION: ICD-10-CM

## 2024-11-12 DIAGNOSIS — Z87.891 PERSONAL HISTORY OF NICOTINE DEPENDENCE: ICD-10-CM

## 2024-11-12 DIAGNOSIS — M47.816 SPONDYLOSIS W/OUT MYELOPATHY OR RADICULOPATHY, LUMBAR REGION: ICD-10-CM

## 2024-11-12 DIAGNOSIS — R29.818 OTHER SYMPTOMS AND SIGNS INVOLVING THE NERVOUS SYSTEM: ICD-10-CM

## 2024-11-12 DIAGNOSIS — R27.9 UNSPECIFIED LACK OF COORDINATION: ICD-10-CM

## 2024-11-12 DIAGNOSIS — R68.89 OTHER GENERAL SYMPTOMS AND SIGNS: ICD-10-CM

## 2024-11-12 DIAGNOSIS — M54.16 RADICULOPATHY, LUMBAR REGION: ICD-10-CM

## 2024-11-12 DIAGNOSIS — R25.1 TREMOR, UNSPECIFIED: ICD-10-CM

## 2024-11-12 DIAGNOSIS — F12.90 CANNABIS USE, UNSPECIFIED, UNCOMPLICATED: ICD-10-CM

## 2024-11-12 DIAGNOSIS — K59.00 CONSTIPATION, UNSPECIFIED: ICD-10-CM

## 2024-11-12 DIAGNOSIS — I10 ESSENTIAL (PRIMARY) HYPERTENSION: ICD-10-CM

## 2024-11-12 PROCEDURE — 99214 OFFICE O/P EST MOD 30 MIN: CPT

## 2024-11-12 PROCEDURE — G0463: CPT

## 2024-11-13 DIAGNOSIS — I10 ESSENTIAL (PRIMARY) HYPERTENSION: ICD-10-CM

## 2024-11-16 ENCOUNTER — NON-APPOINTMENT (OUTPATIENT)
Age: 49
End: 2024-11-16

## 2024-11-16 DIAGNOSIS — E78.5 HYPERLIPIDEMIA, UNSPECIFIED: ICD-10-CM

## 2024-11-18 LAB
ALBUMIN SERPL ELPH-MCNC: 4.6 G/DL
ALP BLD-CCNC: 55 U/L
ALT SERPL-CCNC: 66 U/L
ANION GAP SERPL CALC-SCNC: 13 MMOL/L
AST SERPL-CCNC: 42 U/L
BASOPHILS # BLD AUTO: 0.05 K/UL
BASOPHILS NFR BLD AUTO: 0.6 %
BILIRUB SERPL-MCNC: 0.4 MG/DL
BUN SERPL-MCNC: 13 MG/DL
CALCIUM SERPL-MCNC: 9.6 MG/DL
CHLORIDE SERPL-SCNC: 102 MMOL/L
CHOLEST SERPL-MCNC: 408 MG/DL
CO2 SERPL-SCNC: 25 MMOL/L
CREAT SERPL-MCNC: 0.97 MG/DL
EGFR: 72 ML/MIN/1.73M2
EOSINOPHIL # BLD AUTO: 0.3 K/UL
EOSINOPHIL NFR BLD AUTO: 3.5 %
ESTIMATED AVERAGE GLUCOSE: 120 MG/DL
FERRITIN SERPL-MCNC: 176 NG/ML
GLUCOSE SERPL-MCNC: 94 MG/DL
HBA1C MFR BLD HPLC: 5.8 %
HCT VFR BLD CALC: 40.8 %
HDLC SERPL-MCNC: 43 MG/DL
HGB BLD-MCNC: 13.1 G/DL
IMM GRANULOCYTES NFR BLD AUTO: 0.3 %
IRON SATN MFR SERPL: 27 %
IRON SERPL-MCNC: 102 UG/DL
LDLC SERPL CALC-MCNC: 300 MG/DL
LYMPHOCYTES # BLD AUTO: 2.99 K/UL
LYMPHOCYTES NFR BLD AUTO: 34.8 %
MAN DIFF?: NORMAL
MCHC RBC-ENTMCNC: 30.4 PG
MCHC RBC-ENTMCNC: 32.1 G/DL
MCV RBC AUTO: 94.7 FL
MONOCYTES # BLD AUTO: 0.58 K/UL
MONOCYTES NFR BLD AUTO: 6.8 %
NEUTROPHILS # BLD AUTO: 4.63 K/UL
NEUTROPHILS NFR BLD AUTO: 54 %
NONHDLC SERPL-MCNC: 366 MG/DL
PLATELET # BLD AUTO: 297 K/UL
POTASSIUM SERPL-SCNC: 4.6 MMOL/L
PROT SERPL-MCNC: 7.7 G/DL
RBC # BLD: 4.31 M/UL
RBC # FLD: 13.7 %
SODIUM SERPL-SCNC: 140 MMOL/L
TIBC SERPL-MCNC: 380 UG/DL
TRIGL SERPL-MCNC: 286 MG/DL
TSH SERPL-ACNC: 1.19 UIU/ML
UIBC SERPL-MCNC: 278 UG/DL
WBC # FLD AUTO: 8.58 K/UL

## 2024-11-18 RX ORDER — UBIDECARENONE 200 MG
200 CAPSULE ORAL
Qty: 90 | Refills: 3 | Status: ACTIVE | COMMUNITY
Start: 2024-11-18 | End: 1900-01-01

## 2024-11-18 RX ORDER — ROSUVASTATIN CALCIUM 10 MG/1
10 TABLET, FILM COATED ORAL
Qty: 90 | Refills: 3 | Status: ACTIVE | COMMUNITY
Start: 2024-11-18 | End: 1900-01-01

## 2024-11-20 NOTE — ED ADULT NURSE NOTE - PRIMARY CARE PROVIDER
Health Maintenance       Influenza Vaccine (1)  Overdue since 9/1/2024    COVID-19 Vaccine (3 - 2024-25 season)  Overdue since 9/1/2024    Depression Screening (Yearly)  Due soon on 5/10/2025           Following review of the above:  Patient is not proceeding with: COVID-19 and Influenza    Note: Refer to final orders and clinician documentation.        Recent PHQ 2/9 Score    PHQ 2:  PHQ 2 Score Adult PHQ 2 Score Adult PHQ 2 Interpretation Little interest or pleasure in activity?   5/10/2024  10:11 AM 0 No further screening needed 0       PHQ 9:     Most Recent CLINT 7 Score       CLINT 7 Score CLINT 7 Score   5/13/2024   1:45 PM 0     Advance Directives:  on file       unknown

## 2024-12-20 DIAGNOSIS — M54.6 PAIN IN THORACIC SPINE: ICD-10-CM

## 2024-12-24 ENCOUNTER — APPOINTMENT (OUTPATIENT)
Dept: NEUROLOGY | Facility: CLINIC | Age: 49
End: 2024-12-24

## 2024-12-31 ENCOUNTER — APPOINTMENT (OUTPATIENT)
Dept: ORTHOPEDIC SURGERY | Facility: CLINIC | Age: 49
End: 2024-12-31

## 2025-01-06 ENCOUNTER — APPOINTMENT (OUTPATIENT)
Dept: ORTHOPEDIC SURGERY | Facility: CLINIC | Age: 50
End: 2025-01-06

## 2025-01-09 ENCOUNTER — APPOINTMENT (OUTPATIENT)
Dept: PAIN MANAGEMENT | Facility: CLINIC | Age: 50
End: 2025-01-09

## 2025-01-15 ENCOUNTER — APPOINTMENT (OUTPATIENT)
Dept: NEUROLOGY | Facility: CLINIC | Age: 50
End: 2025-01-15

## 2025-01-21 ENCOUNTER — APPOINTMENT (OUTPATIENT)
Dept: INTERNAL MEDICINE | Facility: CLINIC | Age: 50
End: 2025-01-21

## 2025-01-27 ENCOUNTER — APPOINTMENT (OUTPATIENT)
Dept: INTERNAL MEDICINE | Facility: CLINIC | Age: 50
End: 2025-01-27

## 2025-02-03 ENCOUNTER — APPOINTMENT (OUTPATIENT)
Dept: NEUROLOGY | Facility: CLINIC | Age: 50
End: 2025-02-03
Payer: MEDICAID

## 2025-02-03 VITALS
WEIGHT: 250 LBS | HEIGHT: 72 IN | HEART RATE: 82 BPM | BODY MASS INDEX: 33.86 KG/M2 | SYSTOLIC BLOOD PRESSURE: 123 MMHG | DIASTOLIC BLOOD PRESSURE: 86 MMHG

## 2025-02-03 DIAGNOSIS — R25.1 TREMOR, UNSPECIFIED: ICD-10-CM

## 2025-02-03 PROCEDURE — 99205 OFFICE O/P NEW HI 60 MIN: CPT

## 2025-02-19 ENCOUNTER — APPOINTMENT (OUTPATIENT)
Dept: ORTHOPEDIC SURGERY | Facility: CLINIC | Age: 50
End: 2025-02-19

## 2025-02-26 ENCOUNTER — APPOINTMENT (OUTPATIENT)
Dept: NEUROLOGY | Facility: CLINIC | Age: 50
End: 2025-02-26
Payer: MEDICAID

## 2025-02-26 VITALS
WEIGHT: 250 LBS | HEIGHT: 72 IN | BODY MASS INDEX: 33.86 KG/M2 | HEART RATE: 64 BPM | DIASTOLIC BLOOD PRESSURE: 85 MMHG | SYSTOLIC BLOOD PRESSURE: 121 MMHG

## 2025-02-26 DIAGNOSIS — R06.83 SNORING: ICD-10-CM

## 2025-02-26 DIAGNOSIS — R68.89 OTHER GENERAL SYMPTOMS AND SIGNS: ICD-10-CM

## 2025-02-26 DIAGNOSIS — F41.9 ANXIETY DISORDER, UNSPECIFIED: ICD-10-CM

## 2025-02-26 DIAGNOSIS — G43.009 MIGRAINE W/OUT AURA, NOT INTRACTABLE, W/OUT STATUS MIGRAINOSUS: ICD-10-CM

## 2025-02-26 PROCEDURE — 99214 OFFICE O/P EST MOD 30 MIN: CPT

## 2025-02-26 PROCEDURE — 99204 OFFICE O/P NEW MOD 45 MIN: CPT

## 2025-02-27 RX ORDER — UBROGEPANT 100 MG/1
100 TABLET ORAL
Qty: 14 | Refills: 3 | Status: ACTIVE | COMMUNITY
Start: 2025-02-26 | End: 1900-01-01

## 2025-03-11 ENCOUNTER — APPOINTMENT (OUTPATIENT)
Dept: MRI IMAGING | Facility: CLINIC | Age: 50
End: 2025-03-11

## 2025-03-21 ENCOUNTER — OUTPATIENT (OUTPATIENT)
Dept: OUTPATIENT SERVICES | Facility: HOSPITAL | Age: 50
LOS: 1 days | End: 2025-03-21

## 2025-03-21 DIAGNOSIS — R25.1 TREMOR, UNSPECIFIED: ICD-10-CM

## 2025-03-25 ENCOUNTER — APPOINTMENT (OUTPATIENT)
Dept: ORTHOPEDIC SURGERY | Facility: CLINIC | Age: 50
End: 2025-03-25

## 2025-03-26 ENCOUNTER — OUTPATIENT (OUTPATIENT)
Dept: OUTPATIENT SERVICES | Facility: HOSPITAL | Age: 50
LOS: 1 days | End: 2025-03-26
Payer: MEDICAID

## 2025-03-26 ENCOUNTER — APPOINTMENT (OUTPATIENT)
Dept: MRI IMAGING | Facility: CLINIC | Age: 50
End: 2025-03-26

## 2025-03-26 DIAGNOSIS — R25.1 TREMOR, UNSPECIFIED: ICD-10-CM

## 2025-03-26 PROCEDURE — 70551 MRI BRAIN STEM W/O DYE: CPT

## 2025-03-26 PROCEDURE — 70551 MRI BRAIN STEM W/O DYE: CPT | Mod: 26

## 2025-04-04 ENCOUNTER — APPOINTMENT (OUTPATIENT)
Dept: INTERNAL MEDICINE | Facility: CLINIC | Age: 50
End: 2025-04-04

## 2025-04-21 ENCOUNTER — OUTPATIENT (OUTPATIENT)
Dept: OUTPATIENT SERVICES | Facility: HOSPITAL | Age: 50
LOS: 1 days | End: 2025-04-21

## 2025-04-21 ENCOUNTER — APPOINTMENT (OUTPATIENT)
Dept: INTERNAL MEDICINE | Facility: CLINIC | Age: 50
End: 2025-04-21

## 2025-04-21 DIAGNOSIS — R22.43 LOCALIZED SWELLING, MASS AND LUMP, LOWER LIMB, BILATERAL: ICD-10-CM

## 2025-04-21 DIAGNOSIS — I10 ESSENTIAL (PRIMARY) HYPERTENSION: ICD-10-CM

## 2025-04-21 PROCEDURE — G2211 COMPLEX E/M VISIT ADD ON: CPT | Mod: NC,95

## 2025-04-21 PROCEDURE — 99213 OFFICE O/P EST LOW 20 MIN: CPT | Mod: 95

## 2025-05-01 DIAGNOSIS — J30.9 ALLERGIC RHINITIS, UNSPECIFIED: ICD-10-CM

## 2025-05-02 ENCOUNTER — NON-APPOINTMENT (OUTPATIENT)
Age: 50
End: 2025-05-02

## 2025-05-05 DIAGNOSIS — R22.43 LOCALIZED SWELLING, MASS AND LUMP, LOWER LIMB, BILATERAL: ICD-10-CM

## 2025-05-27 ENCOUNTER — APPOINTMENT (OUTPATIENT)
Dept: NEUROLOGY | Facility: CLINIC | Age: 50
End: 2025-05-27

## 2025-08-04 ENCOUNTER — APPOINTMENT (OUTPATIENT)
Dept: NEUROLOGY | Facility: CLINIC | Age: 50
End: 2025-08-04